# Patient Record
Sex: FEMALE | Race: WHITE | Employment: OTHER | URBAN - NONMETROPOLITAN AREA
[De-identification: names, ages, dates, MRNs, and addresses within clinical notes are randomized per-mention and may not be internally consistent; named-entity substitution may affect disease eponyms.]

---

## 2017-01-03 ENCOUNTER — OFFICE VISIT - GICH (OUTPATIENT)
Dept: FAMILY MEDICINE | Facility: OTHER | Age: 82
End: 2017-01-03

## 2017-01-03 DIAGNOSIS — E78.00 PURE HYPERCHOLESTEROLEMIA: ICD-10-CM

## 2017-01-03 DIAGNOSIS — H35.30 AGE-RELATED MACULAR DEGENERATION: ICD-10-CM

## 2017-01-03 DIAGNOSIS — R26.89 OTHER ABNORMALITIES OF GAIT AND MOBILITY: ICD-10-CM

## 2017-01-03 DIAGNOSIS — E11.8 TYPE 2 DIABETES MELLITUS WITH COMPLICATIONS (H): ICD-10-CM

## 2017-01-03 DIAGNOSIS — I63.9 CEREBRAL INFARCTION (H): ICD-10-CM

## 2017-01-03 LAB
A/G RATIO - HISTORICAL: 1.1 (ref 1–2)
ALBUMIN SERPL-MCNC: 4.3 G/DL (ref 3.5–5.7)
ALP SERPL-CCNC: 67 IU/L (ref 34–104)
ALT (SGPT) - HISTORICAL: 14 IU/L (ref 7–52)
ANION GAP - HISTORICAL: 6 (ref 5–18)
AST SERPL-CCNC: 16 IU/L (ref 13–39)
BILIRUB SERPL-MCNC: 0.5 MG/DL (ref 0.3–1)
BUN SERPL-MCNC: 18 MG/DL (ref 7–25)
BUN/CREAT RATIO - HISTORICAL: 17
CALCIUM SERPL-MCNC: 9.6 MG/DL (ref 8.6–10.3)
CHLORIDE SERPLBLD-SCNC: 104 MMOL/L (ref 98–107)
CHOL/HDL RATIO - HISTORICAL: 3.27
CHOLESTEROL TOTAL: 170 MG/DL
CO2 SERPL-SCNC: 26 MMOL/L (ref 21–31)
CREAT SERPL-MCNC: 1.08 MG/DL (ref 0.7–1.3)
ESTIMATED AVERAGE GLUCOSE: 171 MG/DL
GFR IF NOT AFRICAN AMERICAN - HISTORICAL: 47 ML/MIN/1.73M2
GLOBULIN - HISTORICAL: 3.9 G/DL (ref 2–3.7)
GLUCOSE SERPL-MCNC: 152 MG/DL (ref 70–105)
HDLC SERPL-MCNC: 52 MG/DL (ref 23–92)
HEMOGLOBIN A1C MONITORING (POCT) - HISTORICAL: 7.6 % (ref 4–6.2)
LDLC SERPL CALC-MCNC: 82 MG/DL
NON-HDL CHOLESTEROL - HISTORICAL: 118 MG/DL
PATIENT STATUS - HISTORICAL: ABNORMAL
POTASSIUM SERPL-SCNC: 3.9 MMOL/L (ref 3.5–5.1)
PROT SERPL-MCNC: 8.2 G/DL (ref 6.4–8.9)
SODIUM SERPL-SCNC: 136 MMOL/L (ref 133–143)
TRIGL SERPL-MCNC: 179 MG/DL

## 2017-01-16 ENCOUNTER — AMBULATORY - GICH (OUTPATIENT)
Dept: SCHEDULING | Facility: OTHER | Age: 82
End: 2017-01-16

## 2017-01-22 ENCOUNTER — AMBULATORY - GICH (OUTPATIENT)
Dept: SCHEDULING | Facility: OTHER | Age: 82
End: 2017-01-22

## 2017-02-01 ENCOUNTER — AMBULATORY - GICH (OUTPATIENT)
Dept: SCHEDULING | Facility: OTHER | Age: 82
End: 2017-02-01

## 2017-02-12 ENCOUNTER — COMMUNICATION - GICH (OUTPATIENT)
Dept: FAMILY MEDICINE | Facility: OTHER | Age: 82
End: 2017-02-12

## 2017-02-12 DIAGNOSIS — E11.9 TYPE 2 DIABETES MELLITUS WITHOUT COMPLICATIONS (H): ICD-10-CM

## 2017-04-10 ENCOUNTER — OFFICE VISIT - GICH (OUTPATIENT)
Dept: FAMILY MEDICINE | Facility: OTHER | Age: 82
End: 2017-04-10

## 2017-04-10 ENCOUNTER — HISTORY (OUTPATIENT)
Dept: FAMILY MEDICINE | Facility: OTHER | Age: 82
End: 2017-04-10

## 2017-04-10 DIAGNOSIS — E11.40 TYPE 2 DIABETES MELLITUS WITH DIABETIC NEUROPATHY (H): ICD-10-CM

## 2017-04-10 DIAGNOSIS — H35.30 AGE-RELATED MACULAR DEGENERATION: ICD-10-CM

## 2017-04-10 DIAGNOSIS — E11.8 TYPE 2 DIABETES MELLITUS WITH COMPLICATIONS (H): ICD-10-CM

## 2017-04-10 LAB
ESTIMATED AVERAGE GLUCOSE: 177 MG/DL
HEMOGLOBIN A1C MONITORING (POCT) - HISTORICAL: 7.8 % (ref 4–6.2)

## 2017-06-16 ENCOUNTER — COMMUNICATION - GICH (OUTPATIENT)
Dept: FAMILY MEDICINE | Facility: OTHER | Age: 82
End: 2017-06-16

## 2017-06-16 DIAGNOSIS — E11.8 TYPE 2 DIABETES MELLITUS WITH COMPLICATIONS (H): ICD-10-CM

## 2017-06-16 DIAGNOSIS — E78.00 PURE HYPERCHOLESTEROLEMIA: ICD-10-CM

## 2017-09-18 ENCOUNTER — COMMUNICATION - GICH (OUTPATIENT)
Dept: FAMILY MEDICINE | Facility: OTHER | Age: 82
End: 2017-09-18

## 2017-09-18 DIAGNOSIS — E11.8 TYPE 2 DIABETES MELLITUS WITH COMPLICATIONS (H): ICD-10-CM

## 2017-09-18 DIAGNOSIS — E78.00 PURE HYPERCHOLESTEROLEMIA: ICD-10-CM

## 2017-09-26 ENCOUNTER — HISTORY (OUTPATIENT)
Dept: FAMILY MEDICINE | Facility: OTHER | Age: 82
End: 2017-09-26

## 2017-09-26 ENCOUNTER — OFFICE VISIT - GICH (OUTPATIENT)
Dept: FAMILY MEDICINE | Facility: OTHER | Age: 82
End: 2017-09-26

## 2017-09-26 DIAGNOSIS — E78.00 PURE HYPERCHOLESTEROLEMIA: ICD-10-CM

## 2017-09-26 DIAGNOSIS — Z23 ENCOUNTER FOR IMMUNIZATION: ICD-10-CM

## 2017-09-26 DIAGNOSIS — E11.40 TYPE 2 DIABETES MELLITUS WITH DIABETIC NEUROPATHY (H): ICD-10-CM

## 2017-09-26 DIAGNOSIS — E53.8 DEFICIENCY OF OTHER SPECIFIED B GROUP VITAMINS (CODE): ICD-10-CM

## 2017-09-26 DIAGNOSIS — H35.30 AGE-RELATED MACULAR DEGENERATION: ICD-10-CM

## 2017-09-26 DIAGNOSIS — E11.8 TYPE 2 DIABETES MELLITUS WITH COMPLICATIONS (H): ICD-10-CM

## 2017-09-26 LAB
ANION GAP - HISTORICAL: 9 (ref 5–18)
BUN SERPL-MCNC: 17 MG/DL (ref 7–25)
BUN/CREAT RATIO - HISTORICAL: 17
CALCIUM SERPL-MCNC: 9.5 MG/DL (ref 8.6–10.3)
CHLORIDE SERPLBLD-SCNC: 103 MMOL/L (ref 98–107)
CO2 SERPL-SCNC: 26 MMOL/L (ref 21–31)
CREAT SERPL-MCNC: 1.01 MG/DL (ref 0.7–1.3)
CREATININE RANDOM URINE: 85.1 MG/DL (ref 47–110)
ESTIMATED AVERAGE GLUCOSE: 177 MG/DL
GFR IF NOT AFRICAN AMERICAN - HISTORICAL: 51 ML/MIN/1.73M2
GLUCOSE SERPL-MCNC: 193 MG/DL (ref 70–105)
HEMOGLOBIN A1C MONITORING (POCT) - HISTORICAL: 7.8 % (ref 4–6.2)
POTASSIUM SERPL-SCNC: 3.9 MMOL/L (ref 3.5–5.1)
PROT/CREAT RATIO,UR - HISTORICAL: 0.5
PROTEIN QUANT,RAND URINE - HISTORICAL: 41 MG/DL (ref 1–14)
SODIUM SERPL-SCNC: 138 MMOL/L (ref 133–143)

## 2017-10-12 ENCOUNTER — COMMUNICATION - GICH (OUTPATIENT)
Dept: FAMILY MEDICINE | Facility: OTHER | Age: 82
End: 2017-10-12

## 2017-10-12 DIAGNOSIS — E11.8 TYPE 2 DIABETES MELLITUS WITH COMPLICATIONS (H): ICD-10-CM

## 2017-10-19 ENCOUNTER — COMMUNICATION - GICH (OUTPATIENT)
Dept: FAMILY MEDICINE | Facility: OTHER | Age: 82
End: 2017-10-19

## 2017-10-19 DIAGNOSIS — E11.8 TYPE 2 DIABETES MELLITUS WITH COMPLICATIONS (H): ICD-10-CM

## 2017-12-28 NOTE — TELEPHONE ENCOUNTER
Patient Information     Patient Name MRN Sex Julia Sethi [id number] Female 1922      Telephone Encounter by Mary Paredes RN at 10/19/2017 10:57 AM     Author:  Mary Paredes RN Service:  (none) Author Type:  NURS- Registered Nurse     Filed:  10/19/2017 11:00 AM Encounter Date:  10/19/2017 Status:  Signed     :  Mary Paredes RN (NURS- Registered Nurse)            Diabetic Supplies  Office visit in the past 12 months.  Last visit with GABINO KIRKLAND was on: 2017 in [facility]  Next visit with GABINO KIRKLAND is on: No future appointment listed with this provider  Next visit with Family Practice is on: No future appointment listed in this department  Max refill for 12 months from last office visit.  Always add ICD-9 code.  Needs not be listed on Med List to fill.  Need new RX every 12 months or if exceeds the limitations set by Medicare, then every 6 months.  Also when testing frequency is changed is there a need to obtain a new order.  A refill request does not need to be approved by the ordering physician-a beneficiary or their caregiver may request refills.  Physicians are not required to fill out additional forms such as home testing results for suppliers or provide additional documentation unless the supplier is audited and the  is requesting such documentation.    Prescription refilled per RN Medication Refill Policy.................... Mary Paredes RN ....................  10/19/2017   10:59 AM

## 2017-12-28 NOTE — TELEPHONE ENCOUNTER
Patient Information     Patient Name MRN Sex Julia Sethi [id number] Female 1922      Telephone Encounter by Mariah Bone at 10/16/2017  8:18 AM     Author:  Mariah Bone Service:  (none) Author Type:  (none)     Filed:  10/16/2017  8:19 AM Encounter Date:  10/12/2017 Status:  Signed     :  Mariah Bone            Call placed to patient and states that she does not need anything at this time.  She was up at [facility] earlier today and took care of the pen needle issue she was having.  Mariah Bone LPN ....................  10/16/2017   8:19 AM

## 2017-12-28 NOTE — TELEPHONE ENCOUNTER
Patient Information     Patient Name MRN Sex Julia Sethi [id number] Female 1922      Telephone Encounter by Bella Barclay at 10/12/2017  1:42 PM     Author:  Bella Barclay Service:  (none) Author Type:  (none)     Filed:  10/12/2017  1:43 PM Encounter Date:  10/12/2017 Status:  Signed     :  Bella Barclay            Called pt in regards to message. No answer at this time.  Bella Barclay

## 2017-12-28 NOTE — TELEPHONE ENCOUNTER
Patient Information     Patient Name MRN Sex Julia Sethi [id number] Female 1922      Telephone Encounter by Mary Paredes RN at 2017 12:29 PM     Author:  Mary Paredes RN Service:  (none) Author Type:  NURS- Registered Nurse     Filed:  2017 12:34 PM Encounter Date:  2017 Status:  Signed     :  Mary Paredes RN (NURS- Registered Nurse)            Statins  Office visit in the past 12 months.  Last visit with GABINO KIRKLAND was on: 04/10/2017 in [facility]  Next visit with GABINO KIRKLAND is on: 2017 in [facility]  ast Lipids:  Chol: 170    1/3/2017  T    1/3/2017  HDL:   52    1/3/2017  LDL:  82    1/3/2017  LDL DIRECT:  No results found in past 5 years    .  Concommitant use of fibrates and statins-If it is an addition to the medication list, review note and/or discuss with provider.  If already on medication list, refill.  Max refills 12 months from last office visit.    Prescription refilled per RN Medication Refill Policy.................... Mary Paredes RN ....................  2017   12:34 PM

## 2017-12-28 NOTE — PROGRESS NOTES
Patient Information     Patient Name MRN Sex Julia Sethi [id number] Female 1922      Progress Notes by Della Mauricio MD at 2017 11:00 AM     Author:  Della Mauricio MD Service:  (none) Author Type:  Physician     Filed:  2017 12:33 PM Encounter Date:  2017 Status:  Signed     :  Della Mauricio MD (Physician)            SUBJECTIVE:    Julia Martin is a 89 y.o. female who presents for follow up of type 2 diabetes   Nursing Notes:   Mary Ramos  2017 11:10 AM  Signed  Previous A1C is at goal of <8  HEMOGLOBIN A1C MONITORING (POCT)    Date Value   04/10/2017 7.8 % (H)   2014 9.1 % Total Hgb (H)     Urine microalbumin: labs will be done today   Foot exam today   Eye exam 2017    Patient is not a current smoker  Patient is on a daily aspirin  Patient is on a Statin.  Blood pressure today of   is NOT at the goal of <139/89 for diabetics.    Mary Ramos LPN..............2017 11:02 AM        HPI  Julia Martin is a 89 y.o. female is in for follow up of type 2 diabetes.  History as listed above.  She checks her BS in the morning.  They run from  in the morning.  Later in the day, she thinks they are higher.  It is tough for her to continue to montior her results due to macular degeneration and neuropathy of her hands - has tingling in her fingers and toes.  Occasionally at night her fingers are almost painful.        No Known Allergies,   Current Outpatient Prescriptions     Medication  Sig     aspirin enteric coated (ECOTRIN) 325 mg tablet Take 1 tablet by mouth once daily with a meal.     blood sugar diagnostic (ASCENSIA CONTOUR) strip Test blood sugars once per day Up to 2 times daily if necessary.  Dx: E11.9     cholecalciferol (VITAMIN D) 1,000 unit capsule Take 1 capsule by mouth once daily.     cyanocobalamin 1,000 mcg tablet Take 1 tablet by mouth once daily.     glipiZIDE (GLUCOTROL) 5 mg tablet TAKE 2  TABLETS BY MOUTH EVERY DAY WITH BREAKFAST, THEN TAKE 1 TABLET AT SUPPER TIME.     lancets (MICROLET LANCET) Use to test blood sugars one to two times daily as directed.  Dx: E11.9     simvastatin (ZOCOR) 10 mg tablet Take 1 tablet by mouth at bedtime.     sitaGLIPtin (JANUVIA) 50 mg tablet Take 1 tablet by mouth once daily.     Vit  A,C,E-Zinc-Copper (PRESERVISION AREDS) 14,320-226-200 unit-mg-unit cap Take  by mouth.     Walker Rolling Walker for home use. With seat.     No current facility-administered medications for this visit.      Medications have been reviewed by me and are current to the best of my knowledge and ability.  and   Past Medical History:     Diagnosis  Date     Actinic keratoses      B12 deficiency 4/15/2015     Living will on file 9/23/2015     wears hearing aides        REVIEW OF SYSTEMS:  Review of Systems   HENT:        Episode last 4th of July of awakening with pain in her lower teeth - lasted most of the night.  Took an aspirin for the pain.  Went to dentist - all was good.  Is aware now that this could have been cardiac.   Eyes:        Vision loss   Respiratory: Negative.    Gastrointestinal: Negative.    Musculoskeletal: Negative.      Nocturia x2, otherwise she sleeps well.    Water aerobics a few times a week.  Good appetite.    No joint pain.      OBJECTIVE:  /90  Pulse 72  Ht 1.524 m (5')  Wt 57.6 kg (127 lb)  Breastfeeding? No  BMI 24.8 kg/m2    EXAM:   Physical Exam   Constitutional: She is well-developed, well-nourished, and in no distress.   Musculoskeletal:   Osteoarthritic changes of her   Neurological:   Decreased/absent sensation on monofilament testing of the ends of her fingers, bottoms of her toes.   Psychiatric: Affect normal.   Nursing note and vitals reviewed.      PHQ Depression Screening 8/29/2016 9/26/2017   Date of PHQ exam (doc flow) 8/29/2016 9/26/2017   1. Lack of interest/pleasure 0 - Not at all 0 - Not at all   2. Feeling down/depressed 0 - Not at all  0 - Not at all   PHQ-2 TOTAL SCORE 0 0   3. Trouble sleeping 0 - Not at all -   4. Decreased energy 1 - Several days -   5. Appetite change 0 - Not at all -   6. Feelings of failure 0 - Not at all -   7. Trouble concentrating 0 - Not at all -   8. Activity level 1 - Several days -   9. Hurting yourself 0 - Not at all -   PHQ-9 TOTAL SCORE 2 -   PHQ-9 Severity Level none -   Functional Impairment not difficult at all -   Some recent data might be hidden       Results for orders placed or performed in visit on 09/26/17      Hgb A1c      Result  Value Ref Range    HEMOGLOBIN A1C MONITORING (POCT) 7.8 (H) 4.0 - 6.2 %    ESTIMATED AVERAGE GLUCOSE  177 mg/dL   BASIC METABOLIC PANEL      Result  Value Ref Range    SODIUM 138 133 - 143 mmol/L    POTASSIUM 3.9 3.5 - 5.1 mmol/L    CHLORIDE 103 98 - 107 mmol/L    CO2,TOTAL 26 21 - 31 mmol/L    ANION GAP 9 5 - 18                    GLUCOSE 193 (H) 70 - 105 mg/dL    CALCIUM 9.5 8.6 - 10.3 mg/dL    BUN 17 7 - 25 mg/dL    CREATININE 1.01 0.70 - 1.30 mg/dL    BUN/CREAT RATIO           17                    GFR if African American >60 >60 ml/min/1.73m2    GFR if not African American 51 (L) >60 ml/min/1.73m2   URINE MICROALBUMIN/CREATININE RATIO      Result  Value Ref Range    PROTEIN QUANT,RAND URINE 41 (H) 1 - 14 mg/dL    CREAT,RANDOM URINE 85.1 47.0 - 110.0 mg/dL    PROT/CREAT RATIO,UR       0.5        ASSESSMENT/PLAN:    ICD-10-CM    1. Type 2 diabetes mellitus with diabetic neuropathy, without long-term current use of insulin (HC) E11.40 Hgb A1c      URINE MICROALBUMIN/CREATININE RATIO      BASIC METABOLIC PANEL      Hgb A1c      BASIC METABOLIC PANEL      URINE MICROALBUMIN/CREATININE RATIO   2. MACULAR DEGENERATION, SENILE H35.30    3. Pure hypercholesterolemia E78.00 simvastatin (ZOCOR) 10 mg tablet   4. Needs flu shot Z23 FLU VACCINE => 65 YRS HIGH DOSE TRIVALENT IIV3 IM      ID ADMIN VACC INITIAL   5. B12 deficiency E53.8 cyanocobalamin 1,000 mcg tablet   6. Type 2 diabetes  mellitus with complication, without long-term current use of insulin (HC) E11.8 lancets (MICROLET LANCET)      sitaGLIPtin (JANUVIA) 50 mg tablet      simvastatin (ZOCOR) 10 mg tablet        Plan:  1. Her hemoglobin A1c is stable from her last visit. In previous discussions with the patient, decision was made treat blood sugars to limit her degree of symptoms from hyperglycemia more so than to aim for a hemoglobin A1c goal of less than 7%. I do feel that her risk of hypoglycemia complications with changing her medication is not worth the potential benefits of additional A1c improvement.  2. Discussed with patient the availability of talking glucometers. She'll review this with her pharmacy.  3. Continue with vitamin B12 replacement.  4. Flu vaccine is updated today.  5. She will continue on statin and aspirin therapy.  6. We discussed her episode of jaw and tooth pain, possible anginal equivalent. If she should have similar recurrence, I have recommended that she call 911.    Della Mauricio MD

## 2017-12-28 NOTE — TELEPHONE ENCOUNTER
Patient Information     Patient Name MRN Sex Julia Sethi [id number] Female 1922      Telephone Encounter by Mary Paredes RN at 2017 11:39 AM     Author:  Mary Paredes RN Service:  (none) Author Type:  NURS- Registered Nurse     Filed:  2017 11:42 AM Encounter Date:  2017 Status:  Signed     :  Mary Paredes RN (NURS- Registered Nurse)            Statins  Office visit in the past 12 months.  Last visit with GABINO KIRKLAND was on: 04/10/2017 in [facility]-due for follow up 10/10/17  Next visit with GABINO KIRKLAND is on: No future appointment listed with this provider  Next visit with Family Practice is on: No future appointment listed in this department  Lab testing requirements:  Lipids annually.  Repeat lipids 6-8 weeks after dosage or drug change.  Last Lipids:  Chol: 170    1/3/2017  T    1/3/2017  HDL:   52    1/3/2017  LDL:  82    1/3/2017  LDL DIRECT:  No results found in past 5 years    .  Concommitant use of fibrates and statins-If it is an addition to the medication list, review note and/or discuss with provider.  If already on medication list, refill.  Max refills 12 months from last office visit.    Prescription refilled per RN Medication Refill Policy.................... Mary Paredes RN ....................  2017   11:41 AM

## 2017-12-30 NOTE — NURSING NOTE
Patient Information     Patient Name MRN Sex Julia Sethi [id number] Female 1922      Nursing Note by Mary Ramos at 2017 11:00 AM     Author:  Mary Ramos Service:  (none) Author Type:  (none)     Filed:  2017 11:10 AM Encounter Date:  2017 Status:  Signed     :  Mary Ramos            Previous A1C is at goal of <8  HEMOGLOBIN A1C MONITORING (POCT)    Date Value   04/10/2017 7.8 % (H)   2014 9.1 % Total Hgb (H)     Urine microalbumin: labs will be done today   Foot exam today   Eye exam 2017    Patient is not a current smoker  Patient is on a daily aspirin  Patient is on a Statin.  Blood pressure today of   is NOT at the goal of <139/89 for diabetics.    Mary Ramos LPN..............2017 11:02 AM

## 2018-01-02 NOTE — PATIENT INSTRUCTIONS
Patient Information     Patient Name MRN Sex Julia Sethi [id number] Female 1922      Patient Instructions by Della Mauricio MD at 1/3/2017 11:00 AM     Author:  Della Mauricio MD Service:  (none) Author Type:  Physician     Filed:  1/3/2017 11:20 AM Encounter Date:  1/3/2017 Status:  Signed     :  Della Mauricio MD (Physician)            Prescription is sent to [place] for a walker.

## 2018-01-02 NOTE — PROGRESS NOTES
Patient Information     Patient Name MRN Sex Julia Sotelo [id number] Female 1922      Progress Notes by Della Mauricio MD at 1/3/2017 11:00 AM     Author:  Della Mauricio MD Service:  (none) Author Type:  Physician     Filed:  1/3/2017 12:26 PM Encounter Date:  1/3/2017 Status:  Signed     :  Della Mauricio MD (Physician)            SUBJECTIVE:    Julia Martin is a 89 y.o. female who presents for follow up of type 2 diabetes   Nursing Notes:   Ni Barber  1/3/2017 11:02 AM  Signed  Patient here today for diabetic check  Ni Barber LPN..............................1/3/2017  10:53 AM    HPI  Julia Martin is a 89 y.o. female in for follow up of type 2 diabetes.  She is now taking her Januvia in the morning, for the past 2 months has made that change.  FBS are 130s, higher later thru the day.    HEMOGLOBIN A1C MONITORING (POCT)    Date Value   2017 7.6 % (H)   2014 9.1 % Total Hgb (H)     HEMOGLOBIN A1C GIH (%)    Date Value   2012 8.4 (H)     She had one episode of taking her januvia at bedtime and not eating, was up unable to sleep that night.  No hypoglycemia. She does take an aspirin a day, is on a statin.    No Known Allergies,   Current Outpatient Prescriptions     Medication  Sig     aspirin enteric coated (ECOTRIN) 325 mg tablet Take 1 tablet by mouth once daily with a meal.     blood sugar diagnostic (ASCENSIA CONTOUR) strip Test blood sugars once per day Up to 2 times daily if necessary.  Dx: E11.9     cholecalciferol (VITAMIN D) 1,000 unit capsule Take 1 capsule by mouth once daily.     cyanocobalamin 1,000 mcg tablet Take 1 tablet by mouth once daily.     glipiZIDE (GLUCOTROL) 5 mg tablet TAKE TWO TABLETS BY MOUTH EVERY DAY WITH BREAKFAST, AND TAKE ONE TABLET BY MOUTH AT SUPPERTIME     lancets (MICROLET LANCET) Use to test blood sugars one to two times daily as directed.  Dx: E11.9     simvastatin (ZOCOR) 10 mg tablet Take 1  tablet by mouth at bedtime.     sitaGLIPtin (JANUVIA) 50 mg tablet Take 1 tablet by mouth once daily.     Vit  A,C,E-Zinc-Copper (PRESERVISION AREDS) 14,320226-200 unit-mg-unit cap Take  by mouth.     No current facility-administered medications for this visit.      Medications have been reviewed by me and are current to the best of my knowledge and ability.  and   Past Medical History     Diagnosis  Date     Actinic keratoses      B12 deficiency 4/15/2015     Living will on file 9/23/2015     wears hearing aides        REVIEW OF SYSTEMS:  Review of Systems   Constitutional:        Goes [facility] [email or web address] [facility] twice a week    Eyes:        Vision is much worse, left eye with minimal vision  - followed for macular degeneration   Neurological:        Balance is not as good, especially with first standing; does better if she visualizes herself skking       OBJECTIVE:  /80  Pulse 60  Wt 56.7 kg (125 lb)  BMI 24.41 kg/m2    EXAM:   Physical Exam   Constitutional: She is well-developed, well-nourished, and in no distress.   Eyes:   Very minimal vision, unable to read with just her left eye   Neurological:   Patient is able to get to standing on her own. With eyes closed, she does some mild swaying but does not feel off balance with this.   Vitals reviewed.    Results for orders placed or performed in visit on 01/03/17      HEMOGLOBIN A1C MONITORING (POCT)      Result  Value Ref Range    HEMOGLOBIN A1C MONITORING (POCT) 7.6 (H) 4.0 - 6.2 %    ESTIMATED AVERAGE GLUCOSE  171 mg/dL   COMP METABOLIC PANEL      Result  Value Ref Range    SODIUM 136 133 - 143 mmol/L    POTASSIUM 3.9 3.5 - 5.1 mmol/L    CHLORIDE 104 98 - 107 mmol/L    CO2,TOTAL 26 21 - 31 mmol/L    ANION GAP 6 5 - 18                    GLUCOSE 152 (H) 70 - 105 mg/dL    CALCIUM 9.6 8.6 - 10.3 mg/dL    BUN 18 7 - 25 mg/dL    CREATININE 1.08 0.70 - 1.30 mg/dL    BUN/CREAT RATIO           17                    GFR if African American 57 (L) >60 ml/min/1.73m2     GFR if not  47 (L) >60 ml/min/1.73m2    ALBUMIN 4.3 3.5 - 5.7 g/dL    PROTEIN,TOTAL 8.2 6.4 - 8.9 g/dL    GLOBULIN                  3.9 (H) 2.0 - 3.7 g/dL    A/G RATIO 1.1 1.0 - 2.0                    BILIRUBIN,TOTAL 0.5 0.3 - 1.0 mg/dL    ALK PHOSPHATASE 67 34 - 104 IU/L    ALT (SGPT) 14 7 - 52 IU/L    AST (SGOT) 16 13 - 39 IU/L   LIPID PANEL      Result  Value Ref Range    CHOLESTEROL,TOTAL 170 <200 mg/dL    TRIGLYCERIDES 179 (H) <150 mg/dL    HDL CHOLESTEROL 52 23 - 92 mg/dL    NON-HDL CHOLESTEROL 118 <145 mg/dl    CHOL/HDL RATIO            3.27 <4.50                    LDL CHOLESTEROL 82 <100 mg/dL    PATIENT STATUS            NON-FASTING                     PHQ Depression Screening 3/21/2016 8/29/2016   Date of PHQ exam (doc flow) 3/21/2016 8/29/2016   1. Lack of interest/pleasure 0 - Not at all 0 - Not at all   2. Feeling down/depressed 0 - Not at all 0 - Not at all   PHQ-2 TOTAL SCORE 0 0   3. Trouble sleeping - 0 - Not at all   4. Decreased energy - 1 - Several days   5. Appetite change - 0 - Not at all   6. Feelings of failure - 0 - Not at all   7. Trouble concentrating - 0 - Not at all   8. Activity level - 1 - Several days   9. Hurting yourself - 0 - Not at all   PHQ-9 TOTAL SCORE - 2   PHQ-9 Severity Level - none   Functional Impairment - not difficult at all         ASSESSMENT/PLAN:    ICD-10-CM    1. Type 2 diabetes mellitus with complication, without long-term current use of insulin (HC) E11.8 HEMOGLOBIN A1C MONITORING (POCT)      COMP METABOLIC PANEL      LIPID PANEL      HEMOGLOBIN A1C MONITORING (POCT)      COMP METABOLIC PANEL      LIPID PANEL   2. Pure hypercholesterolemia E78.00 HEMOGLOBIN A1C MONITORING (POCT)      COMP METABOLIC PANEL      LIPID PANEL      HEMOGLOBIN A1C MONITORING (POCT)      COMP METABOLIC PANEL      LIPID PANEL   3. MACULAR DEGENERATION, SENILE H35.30    4. Cerebral infarction, unspecified mechanism (HC) I63.9 Walker   5. Balance problem R26.89 Farhad         Plan:  1. Her hemoglobin A1c has improved. Given her age and other risk factors, I am not recommending any additional changes in her diabetes medications at this time.  2. With her balance concerned, she did ask about a walker. Prescription for this is sent her balance is complicated by diabetes, B12 disease, and macular degeneration.    Follow up in 3-4 months.  Della Mauricio MD

## 2018-01-02 NOTE — NURSING NOTE
Patient Information     Patient Name MRN Sex Julia Sethi [id number] Female 1922      Nursing Note by Ni Barber at 1/3/2017 11:00 AM     Author:  Ni Barber Service:  (none) Author Type:  (none)     Filed:  1/3/2017 11:02 AM Encounter Date:  1/3/2017 Status:  Signed     :  Ni Barber            Patient here today for diabetic check  Ni Barber LPN..............................1/3/2017  10:53 AM

## 2018-01-03 NOTE — TELEPHONE ENCOUNTER
Patient Information     Patient Name MRN Sex Julia Sethi [id number] Female 1922      Telephone Encounter by Ankita Valdivia at 2017  2:17 PM     Author:  Ankita Valdivia Service:  (none) Author Type:  NURS- Registered Nurse     Filed:  2017  2:19 PM Encounter Date:  2017 Status:  Signed     :  Ankita Valdivia (NURS- Registered Nurse)            Sulfonylureas    Office visit in the past 12 months or per provider note.    Last visit with GABINO KIRKLAND was on: 2017 in [facility]  Next visit with GABINO KIRKLAND is on: No future appointment listed with this provider  Next visit with Family Practice is on: No future appointment listed in this department    Lab test requirements:  HgbA1c annually or per provider note.  HEMOGLOBIN A1C MONITORING (POCT)    Date Value   2017 7.6 % (H)   2014 9.1 % Total Hgb (H)     HEMOGLOBIN A1C GI (%)    Date Value   2012 8.4 (H)       Max refill for 12 months from last office visit or per provider note.      Prescription refilled per RN Medication Refill Policy.................... Ankita Valdivia RN ....................  2017   2:19 PM

## 2018-01-04 NOTE — PROGRESS NOTES
Patient Information     Patient Name MRN Sex Julia Sethi [id number] Female 1922      Progress Notes by Della Mauricio MD at 4/10/2017  2:30 PM     Author:  Della Mauricio MD Service:  (none) Author Type:  Physician     Filed:  2017 10:58 AM Encounter Date:  4/10/2017 Status:  Signed     :  Della Mauricio MD (Physician)            SUBJECTIVE:    Julia Martin is a 89 y.o. female who presents for follow up of type 2 diabetes  Nursing Notes:   Ni Barber  4/10/2017  2:38 PM  Signed  Patient here today for diabetic check  Ni Barber LPN..............................4/10/2017  2:15 PM      HPI  Julia Martin is a 89 y.o. female in for follow up of type 2 diabetes.  HEMOGLOBIN A1C MONITORING (POCT)    Date Value   04/10/2017 7.8 % (H)   2014 9.1 % Total Hgb (H)     Last Lipids:  Chol: 1/3/2017 170  T/3/2017 179  HDL: 1/3/2017 52   LDL: 1/3/2017 82  Outpatient Diabetes Medications as of 4/10/2017       Refills Start End    glipiZIDE (GLUCOTROL) 5 mg tablet 3 2017     Sig: TAKE 2 TABLETS BY MOUTH EVERY DAY WITH BREAKFAST, THEN TAKE 1 TABLET AT SUPPER TIME.       Class: eRx       Route: Oral       Cosign for Ordering: Accepted by Della Mauricio MD on 2017  3:58 PM           And Januvia 50mg.      Home monitoring:yes - a couple times a week  FBS range from 90s-150s, later in the day will be higher; she hasn't had any low blood sugars.  In February, she had a longer stretch of having the stomach flu and her blood sugars were quite a bit higher then.    Aspirin:  yes  Statin:  Yes, zocor  Last eye exam: regular eye exams due to macular degeneration        No Known Allergies,   Current Outpatient Prescriptions     Medication  Sig     aspirin enteric coated (ECOTRIN) 325 mg tablet Take 1 tablet by mouth once daily with a meal.     blood sugar diagnostic (ASCENSIA CONTOUR) strip Test blood sugars once per day Up to 2 times daily if  necessary.  Dx: E11.9     cholecalciferol (VITAMIN D) 1,000 unit capsule Take 1 capsule by mouth once daily.     cyanocobalamin 1,000 mcg tablet Take 1 tablet by mouth once daily.     glipiZIDE (GLUCOTROL) 5 mg tablet TAKE 2 TABLETS BY MOUTH EVERY DAY WITH BREAKFAST, THEN TAKE 1 TABLET AT SUPPER TIME.     lancets (MICROLET LANCET) Use to test blood sugars one to two times daily as directed.  Dx: E11.9     simvastatin (ZOCOR) 10 mg tablet Take 1 tablet by mouth at bedtime.     sitaGLIPtin (JANUVIA) 50 mg tablet Take 1 tablet by mouth once daily.     Vit  A,C,E-Zinc-Copper (PRESERVISION AREDS) 14,320-226-200 unit-mg-unit cap Take  by mouth.     Walker Rolling Walker for home use. With seat.     No current facility-administered medications for this visit.      Medications have been reviewed by me and are current to the best of my knowledge and ability.  and   Past Medical History:     Diagnosis  Date     Actinic keratoses      B12 deficiency 4/15/2015     Living will on file 9/23/2015     wears hearing aides        REVIEW OF SYSTEMS:  Review of Systems   Constitutional:        Does water exercise twice a week at [facility] where she [facility].     Musculoskeletal: Negative for falls.   Neurological: Positive for tingling and sensory change.        Decreased sensation fingers and feet.       OBJECTIVE:  /70  Pulse 66  Wt 55.2 kg (121 lb 12.8 oz)  BMI 23.79 kg/m2    EXAM:   Physical Exam   Constitutional: She is well-developed, well-nourished, and in no distress.   Cardiovascular: Normal rate and regular rhythm.    Pulmonary/Chest: Breath sounds normal.   Musculoskeletal:   OA changes of her fingers   Neurological:   Decreased sensation of feet to mid foot   Psychiatric: Affect normal.   Nursing note and vitals reviewed.    PHQ Depression Screening 3/21/2016 8/29/2016   Date of PHQ exam (doc flow) 3/21/2016 8/29/2016   1. Lack of interest/pleasure 0 - Not at all 0 - Not at all   2. Feeling down/depressed 0 - Not  at all 0 - Not at all   PHQ-2 TOTAL SCORE 0 0   3. Trouble sleeping - 0 - Not at all   4. Decreased energy - 1 - Several days   5. Appetite change - 0 - Not at all   6. Feelings of failure - 0 - Not at all   7. Trouble concentrating - 0 - Not at all   8. Activity level - 1 - Several days   9. Hurting yourself - 0 - Not at all   PHQ-9 TOTAL SCORE - 2   PHQ-9 Severity Level - none   Functional Impairment - not difficult at all         ASSESSMENT/PLAN:    ICD-10-CM    1. Type 2 diabetes mellitus with complication, without long-term current use of insulin (HC) E11.8 Hgb A1c      Hgb A1c   2. Type 2 diabetes mellitus with diabetic neuropathy, without long-term current use of insulin (HC) E11.40    3. MACULAR DEGENERATION, SENILE H35.30         Plan:  1.  a1c testing today.  Goal for her is under 8 and hypoglycemia prevention.  2.  Continue regular eye follow up visits.  Follow up in 3-6 months.  Della Mauricio MD

## 2018-01-04 NOTE — NURSING NOTE
Patient Information     Patient Name MRN Sex Julia Sethi [id number] Female 1922      Nursing Note by Ni Barber at 4/10/2017  2:30 PM     Author:  Ni Barber Service:  (none) Author Type:  (none)     Filed:  4/10/2017  2:38 PM Encounter Date:  4/10/2017 Status:  Signed     :  Ni Barber            Patient here today for diabetic check  Ni Barber LPN..............................4/10/2017  2:15 PM

## 2018-01-18 RX ORDER — SIMVASTATIN 10 MG
10 TABLET ORAL AT BEDTIME
COMMUNITY
Start: 2017-09-26 | End: 2018-04-23

## 2018-01-18 RX ORDER — VIT A/VIT C/VIT E/ZINC/COPPER 4296-226
1 CAPSULE ORAL DAILY
COMMUNITY
Start: 2015-11-18

## 2018-01-18 RX ORDER — GLIPIZIDE 5 MG/1
TABLET ORAL
COMMUNITY
Start: 2017-02-13 | End: 2018-04-23

## 2018-01-27 VITALS
BODY MASS INDEX: 24.41 KG/M2 | HEART RATE: 60 BPM | BODY MASS INDEX: 24.94 KG/M2 | HEIGHT: 60 IN | DIASTOLIC BLOOD PRESSURE: 80 MMHG | DIASTOLIC BLOOD PRESSURE: 90 MMHG | SYSTOLIC BLOOD PRESSURE: 140 MMHG | WEIGHT: 127 LBS | HEART RATE: 72 BPM | WEIGHT: 125 LBS | SYSTOLIC BLOOD PRESSURE: 132 MMHG

## 2018-01-27 VITALS
SYSTOLIC BLOOD PRESSURE: 110 MMHG | DIASTOLIC BLOOD PRESSURE: 70 MMHG | BODY MASS INDEX: 23.79 KG/M2 | HEART RATE: 66 BPM | WEIGHT: 121.8 LBS

## 2018-01-29 ENCOUNTER — COMMUNICATION - GICH (OUTPATIENT)
Dept: FAMILY MEDICINE | Facility: OTHER | Age: 83
End: 2018-01-29

## 2018-01-29 DIAGNOSIS — E11.9 TYPE 2 DIABETES MELLITUS WITHOUT COMPLICATIONS (H): ICD-10-CM

## 2018-02-13 NOTE — TELEPHONE ENCOUNTER
Patient Information     Patient Name MRN Sex Julia Sethi [id number] Female 1922      Telephone Encounter by Mary Paredes RN at 2018  2:14 PM     Author:  Mary Paredes RN Service:  (none) Author Type:  NURS- Registered Nurse     Filed:  2018  2:16 PM Encounter Date:  2018 Status:  Signed     :  Mary Paredes RN (NURS- Registered Nurse)            Sulfonylureas  Office visit in the past 12 months or per provider note.  Last visit with GABINO KIRKLAND was on: 2017 in [facility]  Next visit with GABINO KIRKLAND is on: No future appointment listed with this provider  Next visit with Family Practice is on: No future appointment listed in this department  Lab test requirements:  HgbA1c annually or per provider note.  HEMOGLOBIN A1C MONITORING (POCT)    Date Value   2017 7.8 % (H)   2014 9.1 % Total Hgb (H)   Max refill for 12 months from last office visit or per provider note.  Prescription refilled per RN Medication Refill Policy.................... Mary Paredes RN ....................  2018   2:15 PM

## 2018-02-28 ENCOUNTER — TRANSFERRED RECORDS (OUTPATIENT)
Dept: HEALTH INFORMATION MANAGEMENT | Facility: OTHER | Age: 83
End: 2018-02-28

## 2018-04-23 ENCOUNTER — OFFICE VISIT (OUTPATIENT)
Dept: FAMILY MEDICINE | Facility: OTHER | Age: 83
End: 2018-04-23
Attending: FAMILY MEDICINE
Payer: MEDICARE

## 2018-04-23 VITALS
WEIGHT: 122.6 LBS | SYSTOLIC BLOOD PRESSURE: 114 MMHG | HEART RATE: 88 BPM | BODY MASS INDEX: 23.94 KG/M2 | DIASTOLIC BLOOD PRESSURE: 64 MMHG

## 2018-04-23 DIAGNOSIS — G56.02 CARPAL TUNNEL SYNDROME OF LEFT WRIST: ICD-10-CM

## 2018-04-23 DIAGNOSIS — R20.2 LEFT HAND PARESTHESIA: ICD-10-CM

## 2018-04-23 DIAGNOSIS — E11.8 TYPE 2 DIABETES MELLITUS WITH COMPLICATION, WITHOUT LONG-TERM CURRENT USE OF INSULIN (H): Primary | ICD-10-CM

## 2018-04-23 DIAGNOSIS — H35.30 MACULAR DEGENERATION (SENILE) OF RETINA: ICD-10-CM

## 2018-04-23 DIAGNOSIS — E53.8 B12 DEFICIENCY: ICD-10-CM

## 2018-04-23 LAB
ALBUMIN SERPL-MCNC: 4.3 G/DL (ref 3.5–5.7)
ALP SERPL-CCNC: 73 U/L (ref 34–104)
ALT SERPL W P-5'-P-CCNC: 16 U/L (ref 7–52)
ANION GAP SERPL CALCULATED.3IONS-SCNC: 8 MMOL/L (ref 3–14)
AST SERPL W P-5'-P-CCNC: 20 U/L (ref 13–39)
BILIRUB SERPL-MCNC: 0.4 MG/DL (ref 0.3–1)
BUN SERPL-MCNC: 21 MG/DL (ref 7–25)
CALCIUM SERPL-MCNC: 9.7 MG/DL (ref 8.6–10.3)
CHLORIDE SERPL-SCNC: 102 MMOL/L (ref 98–107)
CO2 SERPL-SCNC: 28 MMOL/L (ref 21–31)
CREAT SERPL-MCNC: 1.07 MG/DL (ref 0.6–1.2)
GFR SERPL CREATININE-BSD FRML MDRD: 48 ML/MIN/1.7M2
GLUCOSE SERPL-MCNC: 188 MG/DL (ref 70–105)
HBA1C MFR BLD: 7.5 % (ref 4–6)
POTASSIUM SERPL-SCNC: 4.1 MMOL/L (ref 3.5–5.1)
PROT SERPL-MCNC: 8.2 G/DL (ref 6.4–8.9)
SODIUM SERPL-SCNC: 138 MMOL/L (ref 134–144)
VIT B12 SERPL-MCNC: >1500 PG/ML (ref 180–914)

## 2018-04-23 PROCEDURE — 99214 OFFICE O/P EST MOD 30 MIN: CPT | Performed by: FAMILY MEDICINE

## 2018-04-23 PROCEDURE — G0463 HOSPITAL OUTPT CLINIC VISIT: HCPCS

## 2018-04-23 PROCEDURE — 80053 COMPREHEN METABOLIC PANEL: CPT | Performed by: FAMILY MEDICINE

## 2018-04-23 PROCEDURE — 83036 HEMOGLOBIN GLYCOSYLATED A1C: CPT | Performed by: FAMILY MEDICINE

## 2018-04-23 PROCEDURE — 82607 VITAMIN B-12: CPT | Performed by: FAMILY MEDICINE

## 2018-04-23 PROCEDURE — 36415 COLL VENOUS BLD VENIPUNCTURE: CPT | Performed by: FAMILY MEDICINE

## 2018-04-23 RX ORDER — GLIPIZIDE 5 MG/1
5 TABLET ORAL
Qty: 90 TABLET | Refills: 3 | Status: SHIPPED | OUTPATIENT
Start: 2018-04-23 | End: 2018-09-10

## 2018-04-23 RX ORDER — SIMVASTATIN 10 MG
10 TABLET ORAL AT BEDTIME
Qty: 90 TABLET | Refills: 3 | Status: SHIPPED | OUTPATIENT
Start: 2018-04-23 | End: 2019-03-18

## 2018-04-23 ASSESSMENT — PAIN SCALES - GENERAL: PAINLEVEL: NO PAIN (0)

## 2018-04-23 ASSESSMENT — ENCOUNTER SYMPTOMS
FREQUENCY: 1
HEMATOLOGIC/LYMPHATIC NEGATIVE: 1
PSYCHIATRIC NEGATIVE: 1
UNEXPECTED WEIGHT CHANGE: 0

## 2018-04-23 NOTE — NURSING NOTE
Patient presents today for diabetic check up.     Previous A1C is at goal of <8  9/26/2017, A1C 7.8  Urine microalbumin:creatine:   Foot exam 9/26/2017  Eye exam 10/2017    Tobacco User no  Patient is on a daily aspirin  Patient is on a Statin.  Blood pressure today of:     BP Readings from Last 1 Encounters:   04/23/18 114/64      is at the goal of <139/89 for diabetics.    Letty Snell LPN on 4/23/2018 at 1:08 PM        Letty Snell LPN on 4/23/2018 at 1:04 PM

## 2018-04-23 NOTE — LETTER
April 23, 2018      Julia Martin  [address]   [place] [address]        Dear Julia,       Here is a copy of your recent labs:    Results for orders placed or performed in visit on 04/23/18   Hemoglobin A1c   Result Value Ref Range    Hemoglobin A1C 7.5 (H) 4.0 - 6.0 %   Comprehensive metabolic panel   Result Value Ref Range    Sodium 138 134 - 144 mmol/L    Potassium 4.1 3.5 - 5.1 mmol/L    Chloride 102 98 - 107 mmol/L    Carbon Dioxide 28 21 - 31 mmol/L    Anion Gap 8 3 - 14 mmol/L    Glucose 188 (H) 70 - 105 mg/dL    Urea Nitrogen 21 7 - 25 mg/dL    Creatinine 1.07 0.60 - 1.20 mg/dL    GFR Estimate 48 (L) >60 mL/min/1.7m2    GFR Estimate If Black 58 (L) >60 mL/min/1.7m2    Calcium 9.7 8.6 - 10.3 mg/dL    Bilirubin Total 0.4 0.3 - 1.0 mg/dL    Albumin 4.3 3.5 - 5.7 g/dL    Protein Total 8.2 6.4 - 8.9 g/dL    Alkaline Phosphatase 73 34 - 104 U/L    ALT 16 7 - 52 U/L    AST 20 13 - 39 U/L   Vitamin B12   Result Value Ref Range    Vitamin B12 >1500 (H) 180 - 914 pg/mL           These results are normal - including your vitamin VITAMIN B12 level.      Sincerely,        GABINO LÓPEZ MD

## 2018-04-23 NOTE — MR AVS SNAPSHOT
"              After Visit Summary   4/23/2018    Julia Martin    MRN: [id number]           Patient Information     Date Of Birth          1/9/1922        Visit Information        Provider Department      4/23/2018 1:15 PM Della Rhodes MD [facility]        Today's Diagnoses     Type 2 diabetes mellitus with complication, without long-term current use of insulin (H)    -  1    Left hand paresthesia        Carpal tunnel syndrome of left wrist        B12 deficiency        Living will on file        Macular degeneration (senile) of retina           Follow-ups after your visit        Who to contact     If you have questions or need follow up information about today's clinic visit or your schedule please contact [facility] directly at [phone].  Normal or non-critical lab and imaging results will be communicated to you by [employer] HOLD[employer]art, letter or phone within 4 business days after the clinic has received the results. If you do not hear from us within 7 days, please contact the clinic through [employer] or phone. If you have a critical or abnormal lab result, we will notify you by phone as soon as possible.  Submit refill requests through [email or web address] or call your pharmacy and they will forward the refill request to us. Please allow 3 business days for your refill to be completed.          Additional Information About Your Visit        TIFF[employer] TRE[employer] HOLDINGSharMumart Information     [email or web address] lets you send messages to your doctor, view your test results, renew your prescriptions, schedule appointments and more. To sign up, go to [email or web address] . Click on \"Log in\" on the left side of the screen, which will take you to the Welcome page. Then click on \"Sign up Now\" on the right side of the page.     You will be asked to enter the access code listed below, as well as some personal information. Please follow the directions to create your username and password.     Your access code is: " [email or web address]  Expires: 2018  4:45 PM     Your access code will  in 90 days. If you need help or a new code, please call your [facility] clinic or [phone].        Care EveryWhere ID     This is your Care EveryWhere ID. This could be used by other organizations to access your [facility] medical records  [id number]        Your Vitals Were     Pulse Breastfeeding? BMI (Body Mass Index)             88 No 23.94 kg/m2          Blood Pressure from Last 3 Encounters:   18 114/64   17 140/90   04/10/17 110/70    Weight from Last 3 Encounters:   18 122 lb 9.6 oz (55.6 kg)   17 127 lb (57.6 kg)   04/10/17 121 lb 12.8 oz (55.2 kg)              We Performed the Following     Comprehensive metabolic panel     EXTENSOR I WRIST LAKE M LT     Hemoglobin A1c     Vitamin B12          Today's Medication Changes          These changes are accurate as of 18  4:45 PM.  If you have any questions, ask your nurse or doctor.               These medicines have changed or have updated prescriptions.        Dose/Directions    glipiZIDE 5 MG tablet   Commonly known as:  GLUCOTROL   This may have changed:    - how much to take  - when to take this   Used for:  Type 2 diabetes mellitus with complication, without long-term current use of insulin (H)   Changed by:  Della Rhodes MD        Dose:  5 mg   Take 1 tablet (5 mg) by mouth 2 times daily (before meals) TAKE 2 TABLETS BY MOUTH EVERY DAY WITH BREAKFAST, THEN TAKE 1 TABLET AT SUPPER TIME.   Quantity:  90 tablet   Refills:  3       sitagliptin 50 MG tablet   Commonly known as:  JANUVIA   This may have changed:  additional instructions   Used for:  Type 2 diabetes mellitus with complication, without long-term current use of insulin (H)   Changed by:  Della Rhodes MD        Dose:  50 mg   Take 1 tablet (50 mg) by mouth daily Take 50 mg by mouth daily   Quantity:  90 tablet   Refills:  3            Where to get your medicines       These medications were sent to [facility] - [place], [place] - [address]  [address] [place], [facility] [address]     Phone:  [phone]     simvastatin 10 MG tablet    sitagliptin 50 MG tablet         Some of these will need a paper prescription and others can be bought over the counter.  Ask your nurse if you have questions.     Bring a paper prescription for each of these medications     glipiZIDE 5 MG tablet                Primary Care Provider Office Phone # Fax #    Della GAXIOLA Anibal Santos [phone]       [address]  [facility] [address]        Equal Access to Services     [facility]: Hadii aad tere hadasho Sokateali, waaxda luqadaha, qaybta kaalmada adeegyada, monica faria . So [facility] [place] [phone].    ATENCIÓN: Si habla español, tiene a temple disposición servicios gratuitos de asistencia lingüística. [facility] [phone].    We comply with applicable federal civil rights laws and [place] laws. We do not discriminate on the basis of race, color, national origin, age, disability, sex, sexual orientation, or gender identity.            Thank you!     Thank you for choosing [facility] AND [facility]  for your care. Our goal is always to provide you with excellent care. Hearing back from our patients is one way we can continue to improve our services. Please take a few minutes to complete the written survey that you may receive in the mail after your visit with us. Thank you!             Your Updated Medication List - Protect others around you: Learn how to safely use, store and throw away your medicines at www.disposemymeds.org.          This list is accurate as of 4/23/18  4:45 PM.  Always use your most recent med list.                   Brand Name Dispense Instructions for use Diagnosis    aspirin 325 MG EC tablet      Take 325 mg by mouth daily with food        GLENIS CONTOUR test strip   Generic drug:   blood glucose monitoring      Test blood sugars once per day Up to 2 times daily if necessary.  Dx: E11.9        blood glucose monitoring lancets      Use to test blood sugars one to two times daily as directed.  Dx: E11.9        glipiZIDE 5 MG tablet    GLUCOTROL    90 tablet    Take 1 tablet (5 mg) by mouth 2 times daily (before meals) TAKE 2 TABLETS BY MOUTH EVERY DAY WITH BREAKFAST, THEN TAKE 1 TABLET AT SUPPER TIME.    Type 2 diabetes mellitus with complication, without long-term current use of insulin (H)       PRESERVISION AREDS Caps           RA VITAMIN B-12 TR 1000 MCG Tbcr   Generic drug:  cyanocobalamin      Take 1,000 mcg by mouth daily        roller walker Misc      Rolling Walker for home use. With seat.        simvastatin 10 MG tablet    ZOCOR    90 tablet    Take 1 tablet (10 mg) by mouth At Bedtime    Type 2 diabetes mellitus with complication, without long-term current use of insulin (H)       sitagliptin 50 MG tablet    JANUVIA    90 tablet    Take 1 tablet (50 mg) by mouth daily Take 50 mg by mouth daily    Type 2 diabetes mellitus with complication, without long-term current use of insulin (H)       vitamin D3 1000 units Caps      Take 1,000 Units by mouth daily

## 2018-04-23 NOTE — PROGRESS NOTES
Nursing Notes:   Letty Snell LPN  4/23/2018  1:22 PM  Signed  Patient presents today for diabetic check up.     Previous A1C is at goal of <8  9/26/2017, A1C 7.8  Urine microalbumin:creatine:   Foot exam 9/26/2017  Eye exam 10/2017    Tobacco User no  Patient is on a daily aspirin  Patient is on a Statin.  Blood pressure today of:     BP Readings from Last 1 Encounters:   04/23/18 114/64      is at the goal of <139/89 for diabetics.    Letty Snell LPN on 4/23/2018 at 1:08 PM        Letty Snell LPN on 4/23/2018 at 1:04 PM        SUBJECTIVE:   CC:  Julia Martin is a 89 year old female who presents to clinic today for the following health issues:  Type 2 diabetes     HPI  Julia Martin is a 89 year old female in for follow up of type 2 diabetes.  She is on oral medications for this.  She doesn't check BS daily - is difficult for her to see her meter due to severe macular degeneration..  Her FBS  Are from 100-200.  No low BS.  Was very sick in February - wasn't taking her readings then.    With her vision, she is very limited in reading, seeing faces.      She lives at [facility].  Does water classes twice a week and elliptical one day a week.  She does have a walker which she uses intermittently.  Oftentimes she will use the walker to help with her balance problems because of her vision.  Is up at night due to nocturia - can get back to sleep quickly.    Living will is reviewed.  Her current goal is to make it to the 2018 midterm elections.       No Known Allergies  Current Outpatient Prescriptions   Medication     aspirin  MG EC tablet     blood glucose monitoring (GLENIS CONTOUR) test strip     blood glucose monitoring (GLENIS MICROLET) lancets     Cholecalciferol (VITAMIN D3) 1000 UNITS CAPS     cyanocobalamin (RA VITAMIN B-12 TR) 1000 MCG TBCR     glipiZIDE (GLUCOTROL) 5 MG tablet     Misc. Devices (ROLLER WALKER) MISC     Multiple Vitamins-Minerals (PRESERVISION AREDS) CAPS      simvastatin (ZOCOR) 10 MG tablet     sitagliptin (JANUVIA) 50 MG tablet     [DISCONTINUED] glipiZIDE (GLUCOTROL) 5 MG tablet     [DISCONTINUED] simvastatin (ZOCOR) 10 MG tablet     [DISCONTINUED] sitagliptin (JANUVIA) 50 MG tablet     No current facility-administered medications for this visit.       Patient Active Problem List    Diagnosis Date Noted     Type 2 diabetes mellitus with complication, without long-term current use of insulin (H) 08/29/2016     Priority: Medium     Living will on file 09/23/2015     Priority: Medium     B12 deficiency 04/15/2015     Priority: Medium     Cerebral infarction (H) 10/02/2014     Priority: Medium     Diabetic neuropathy, type II diabetes mellitus (H) 04/07/2014     Priority: Medium     ACP (advance care planning) 12/10/2013     Priority: Medium     Carpal tunnel syndrome of left wrist 07/09/2013     Priority: Medium     Macular degeneration (senile) of retina 07/25/2012     Priority: Medium     Actinic keratosis 06/27/2008     Priority: Medium     Pure hypercholesterolemia 11/02/2004     Priority: Medium     Family History   Problem Relation Age of Onset     Breast Cancer Mother      Cancer-breast, breast cancer     Other - See Comments Mother      Stroke,stroke or sudden death     Other - See Comments Daughter       Parkinson's     Other - See Comments Brother       Parkinson's     Other - See Comments Brother       kidney failure     Other - See Comments Sister      Stroke,stroke     Other - See Comments Daughter      ovarian cancer     Social History   Substance Use Topics     Smoking status: Former Smoker     Smokeless tobacco: Never Used     Alcohol use No       Review of Systems   Constitutional: Negative for unexpected weight change.   HENT:        Bilateral hearing aids   Eyes: Positive for visual disturbance.   Genitourinary: Positive for frequency and pelvic pain.   Hematological: Negative.    Psychiatric/Behavioral: Negative.     she is having tingling in her  fingers at night - better with moving her fingers; she has a previous diagnosis of right carpal tunnel syndrome, has had a brace for that.  She would like one for her left hand.  Hearing aids      PHQ-2 Score:     PHQ-2 ( 1999 Pfizer) 4/23/2018   Q1: Little interest or pleasure in doing things 0   Q2: Feeling down, depressed or hopeless 0   PHQ-2 Score 0           PHQ-9 SCORE 8/29/2016   Total Score 2         OBJECTIVE:     /64 (BP Location: Right arm, Patient Position: Sitting, Cuff Size: Adult Regular)  Pulse 88  Wt 122 lb 9.6 oz (55.6 kg)  Breastfeeding? No  BMI 23.94 kg/m2  Body mass index is 23.94 kg/(m^2).  Physical Exam   Constitutional: She appears well-developed and well-nourished.   Neck:   Injection of right lower eyelid   Cardiovascular: Normal rate and regular rhythm.    Pulmonary/Chest: Breath sounds normal.   Musculoskeletal:   Osteoarthritic changes of her hands   Nursing note and vitals reviewed.       Results for orders placed or performed in visit on 04/23/18   Hemoglobin A1c   Result Value Ref Range    Hemoglobin A1C 7.5 (H) 4.0 - 6.0 %   Comprehensive metabolic panel   Result Value Ref Range    Sodium 138 134 - 144 mmol/L    Potassium 4.1 3.5 - 5.1 mmol/L    Chloride 102 98 - 107 mmol/L    Carbon Dioxide 28 21 - 31 mmol/L    Anion Gap 8 3 - 14 mmol/L    Glucose 188 (H) 70 - 105 mg/dL    Urea Nitrogen 21 7 - 25 mg/dL    Creatinine 1.07 0.60 - 1.20 mg/dL    GFR Estimate 48 (L) >60 mL/min/1.7m2    GFR Estimate If Black 58 (L) >60 mL/min/1.7m2    Calcium 9.7 8.6 - 10.3 mg/dL    Bilirubin Total 0.4 0.3 - 1.0 mg/dL    Albumin 4.3 3.5 - 5.7 g/dL    Protein Total 8.2 6.4 - 8.9 g/dL    Alkaline Phosphatase 73 34 - 104 U/L    ALT 16 7 - 52 U/L    AST 20 13 - 39 U/L   Vitamin B12   Result Value Ref Range    Vitamin B12 >1500 (H) 180 - 914 pg/mL         ASSESSMENT/PLAN:       ICD-10-CM    1. Type 2 diabetes mellitus with complication, without long-term current use of insulin (H) E11.8 Hemoglobin  A1c     Comprehensive metabolic panel     sitagliptin (JANUVIA) 50 MG tablet     simvastatin (ZOCOR) 10 MG tablet     glipiZIDE (GLUCOTROL) 5 MG tablet   2. Left hand paresthesia R20.2 EXTENSOR I WRIST TAN M LT   3. Carpal tunnel syndrome of left wrist G56.02 EXTENSOR I WRIST LAKE M LT     Vitamin B12   4. B12 deficiency E53.8 Vitamin B12   5. Living will on file Z87.898    6. Macular degeneration (senile) of retina H35.30             PLAN:  1.  I have personally reviewed the labs listed above.  2.  Continue same diabetes treatment.  Main goal is to control general symptoms of hyperglycemia and prevent hypoglycemic episodes.  3.  Left wrist brace is given.  She is to wear this as needed for comfort.  4.  Continue same B12 replacement.  5.  She will continue follow-up with ophthalmology as indicated.  6.  Living will reviewed, goals of care reviewed.    Follow-up in 6 months, after the election      GABINO LÓPEZ MD  [facility]

## 2018-07-23 NOTE — PROGRESS NOTES
Patient Information     Patient Name  Julia Martin MRN  [id number] Sex  Female   1922      Letter by Della Paz MD at      Author:  Della Paz MD Service:  (none) Author Type:  (none)    Filed:   Encounter Date:  1/3/2017 Status:  (Other)           Julia Martin  [address]  [address]  [place] [address]          January 3, 2017    Dear Ms. Martin:    Here is a copy of today's results:    Results for orders placed or performed in visit on 17      HEMOGLOBIN A1C MONITORING (POCT)      Result  Value Ref Range    HEMOGLOBIN A1C MONITORING (POCT) 7.6 (H) 4.0 - 6.2 %    ESTIMATED AVERAGE GLUCOSE  171 mg/dL   COMP METABOLIC PANEL      Result  Value Ref Range    SODIUM 136 133 - 143 mmol/L    POTASSIUM 3.9 3.5 - 5.1 mmol/L    CHLORIDE 104 98 - 107 mmol/L    CO2,TOTAL 26 21 - 31 mmol/L    ANION GAP 6 5 - 18                    GLUCOSE 152 (H) 70 - 105 mg/dL    CALCIUM 9.6 8.6 - 10.3 mg/dL    BUN 18 7 - 25 mg/dL    CREATININE 1.08 0.70 - 1.30 mg/dL    BUN/CREAT RATIO           17                    GFR if African American 57 (L) >60 ml/min/1.73m2    GFR if not  47 (L) >60 ml/min/1.73m2    ALBUMIN 4.3 3.5 - 5.7 g/dL    PROTEIN,TOTAL 8.2 6.4 - 8.9 g/dL    GLOBULIN                  3.9 (H) 2.0 - 3.7 g/dL    A/G RATIO 1.1 1.0 - 2.0                    BILIRUBIN,TOTAL 0.5 0.3 - 1.0 mg/dL    ALK PHOSPHATASE 67 34 - 104 IU/L    ALT (SGPT) 14 7 - 52 IU/L    AST (SGOT) 16 13 - 39 IU/L   LIPID PANEL      Result  Value Ref Range    CHOLESTEROL,TOTAL 170 <200 mg/dL    TRIGLYCERIDES 179 (H) <150 mg/dL    HDL CHOLESTEROL 52 23 - 92 mg/dL    NON-HDL CHOLESTEROL 118 <145 mg/dl    CHOL/HDL RATIO            3.27 <4.50                    LDL CHOLESTEROL 82 <100 mg/dL    PATIENT STATUS            NON-FASTING                     I am happy to see that your A1c level has improved a fair amount since last fall.  No changes in her medication are needed at this  time.    Sincerely,  Della Maurciio MD FAAFP 1/3/2017 12:22 PM

## 2018-07-24 NOTE — PROGRESS NOTES
Patient Information     Patient Name  Julia Martin MRN  [id number] Sex  Female   1922      Letter by Della Paz MD at      Author:  Della Paz MD Service:  (none) Author Type:  (none)    Filed:   Encounter Date:  2017 Status:  (Other)           Julia Martin  [address]  [address]  [place] [address]          2017    Dear Ms. Martin:    Here is a copy of your recent labs:      Results for orders placed or performed in visit on 17      Hgb A1c      Result  Value Ref Range    HEMOGLOBIN A1C MONITORING (POCT) 7.8 (H) 4.0 - 6.2 %    ESTIMATED AVERAGE GLUCOSE  177 mg/dL   BASIC METABOLIC PANEL      Result  Value Ref Range    SODIUM 138 133 - 143 mmol/L    POTASSIUM 3.9 3.5 - 5.1 mmol/L    CHLORIDE 103 98 - 107 mmol/L    CO2,TOTAL 26 21 - 31 mmol/L    ANION GAP 9 5 - 18                    GLUCOSE 193 (H) 70 - 105 mg/dL    CALCIUM 9.5 8.6 - 10.3 mg/dL    BUN 17 7 - 25 mg/dL    CREATININE 1.01 0.70 - 1.30 mg/dL    BUN/CREAT RATIO           17                    GFR if African American >60 >60 ml/min/1.73m2    GFR if not African American 51 (L) >60 ml/min/1.73m2   URINE MICROALBUMIN/CREATININE RATIO      Result  Value Ref Range    PROTEIN QUANT,RAND URINE 41 (H) 1 - 14 mg/dL    CREAT,RANDOM URINE 85.1 47.0 - 110.0 mg/dL    PROT/CREAT RATIO,UR       0.5        Your hemoglobin A1c is unchanged from when last checked.  I do not recommend any medication changes at this time.    Please let me know what you find out from [facility] about a talking glucometer.  I can sen in a prescription for this if needed.    Sincerely,  Della Mauricio MD [facility] 2017 12:25 PM

## 2018-07-24 NOTE — PROGRESS NOTES
Patient Information     Patient Name  Julia Martin MRN  [id number] Sex  Female   1922      Letter by Della Paz MD at      Author:  Della Paz MD Service:  (none) Author Type:  (none)    Filed:   Encounter Date:  4/10/2017 Status:  (Other)           Julia Martin  [address]  [address]  [place] [address]          2017    Dear Ms. Martin:    Here is a copy of your recent labwork:    Results for orders placed or performed in visit on 04/10/17      Hgb A1c      Result  Value Ref Range    HEMOGLOBIN A1C MONITORING (POCT) 7.8 (H) 4.0 - 6.2 %    ESTIMATED AVERAGE GLUCOSE  177 mg/dL     This is fairly stable from your last check.  I do not recommend any medication changes at this time.    Sincerely,  Della Mauricio MD [facility] 2017 7:47 AM

## 2018-09-10 DIAGNOSIS — E11.8 TYPE 2 DIABETES MELLITUS WITH COMPLICATION, WITHOUT LONG-TERM CURRENT USE OF INSULIN (H): ICD-10-CM

## 2018-09-12 RX ORDER — GLIPIZIDE 5 MG/1
TABLET ORAL
Qty: 90 TABLET | Refills: 3 | Status: SHIPPED | OUTPATIENT
Start: 2018-09-12 | End: 2019-01-04

## 2018-09-12 NOTE — TELEPHONE ENCOUNTER
Glucotrol  LOV-04/23/[id number] in 6 months, after the election    Routing refill request to provider for review/approval because: Labs not current:         Patient has documented LDL within the past 12 mos.           Recent Labs   Lab Test  01/03/17   1205   LDL  82                  Patient has had a Microalbumin in the past 12 mos.           Recent Labs   Lab Test  08/29/16   2320   [id number]  14.0           Unable to complete prescription refill per RNMedication Refill Policy.................... Mary Paredes ....................  9/12/2018   3:09 PM

## 2018-09-13 DIAGNOSIS — E11.8 TYPE 2 DIABETES MELLITUS WITH COMPLICATION, WITHOUT LONG-TERM CURRENT USE OF INSULIN (H): Primary | ICD-10-CM

## 2018-09-13 RX ORDER — LANCETS
EACH MISCELLANEOUS
Qty: 102 EACH | Refills: 2 | Status: SHIPPED | OUTPATIENT
Start: 2018-09-13

## 2018-09-13 NOTE — TELEPHONE ENCOUNTER
DM Supplies  LOV-04/23/2018  Prescription refilled per RN Medication RefillPolicy.................... Mary Paredes ....................  9/13/2018   10:46 AM

## 2018-10-09 ENCOUNTER — APPOINTMENT (OUTPATIENT)
Dept: GENERAL RADIOLOGY | Facility: OTHER | Age: 83
End: 2018-10-09
Attending: PHYSICIAN ASSISTANT
Payer: MEDICARE

## 2018-10-09 ENCOUNTER — HOSPITAL ENCOUNTER (EMERGENCY)
Facility: OTHER | Age: 83
Discharge: HOME | End: 2018-10-09
Attending: PHYSICIAN ASSISTANT | Admitting: PHYSICIAN ASSISTANT
Payer: MEDICARE

## 2018-10-09 VITALS
RESPIRATION RATE: 19 BRPM | TEMPERATURE: 98.5 F | SYSTOLIC BLOOD PRESSURE: 107 MMHG | DIASTOLIC BLOOD PRESSURE: 64 MMHG | HEIGHT: 60 IN | BODY MASS INDEX: 24.15 KG/M2 | HEART RATE: 77 BPM | WEIGHT: 123 LBS | OXYGEN SATURATION: 97 %

## 2018-10-09 DIAGNOSIS — K59.01 SLOW TRANSIT CONSTIPATION: ICD-10-CM

## 2018-10-09 DIAGNOSIS — K04.7 DENTAL ABSCESS: ICD-10-CM

## 2018-10-09 DIAGNOSIS — J18.9 PNEUMONIA OF RIGHT MIDDLE LOBE DUE TO INFECTIOUS ORGANISM: ICD-10-CM

## 2018-10-09 LAB
ALBUMIN SERPL-MCNC: 4.2 G/DL (ref 3.5–5.7)
ALBUMIN UR-MCNC: 100 MG/DL
ALP SERPL-CCNC: 55 U/L (ref 34–104)
ALT SERPL W P-5'-P-CCNC: 14 U/L (ref 7–52)
ANION GAP SERPL CALCULATED.3IONS-SCNC: 13 MMOL/L (ref 3–14)
APPEARANCE UR: CLEAR
AST SERPL W P-5'-P-CCNC: 18 U/L (ref 13–39)
BASOPHILS # BLD AUTO: 0 10E9/L (ref 0–0.2)
BASOPHILS NFR BLD AUTO: 0.4 %
BILIRUB SERPL-MCNC: 0.8 MG/DL (ref 0.3–1)
BILIRUB UR QL STRIP: NEGATIVE
BUN SERPL-MCNC: 15 MG/DL (ref 7–25)
CALCIUM SERPL-MCNC: 9.4 MG/DL (ref 8.6–10.3)
CHLORIDE SERPL-SCNC: 97 MMOL/L (ref 98–107)
CO2 SERPL-SCNC: 22 MMOL/L (ref 21–31)
COLOR UR AUTO: YELLOW
CREAT SERPL-MCNC: 0.84 MG/DL (ref 0.6–1.2)
DIFFERENTIAL METHOD BLD: ABNORMAL
EOSINOPHIL # BLD AUTO: 0 10E9/L (ref 0–0.7)
EOSINOPHIL NFR BLD AUTO: 0.1 %
ERYTHROCYTE [DISTWIDTH] IN BLOOD BY AUTOMATED COUNT: 13.2 % (ref 10–15)
GFR SERPL CREATININE-BSD FRML MDRD: 63 ML/MIN/1.7M2
GLUCOSE SERPL-MCNC: 185 MG/DL (ref 70–105)
GLUCOSE UR STRIP-MCNC: NEGATIVE MG/DL
HCT VFR BLD AUTO: 42.4 % (ref 35–47)
HGB BLD-MCNC: 14.1 G/DL (ref 11.7–15.7)
HGB UR QL STRIP: ABNORMAL
IMM GRANULOCYTES # BLD: 0 10E9/L (ref 0–0.4)
IMM GRANULOCYTES NFR BLD: 0.3 %
KETONES UR STRIP-MCNC: 40 MG/DL
LEUKOCYTE ESTERASE UR QL STRIP: NEGATIVE
LYMPHOCYTES # BLD AUTO: 0.7 10E9/L (ref 0.8–5.3)
LYMPHOCYTES NFR BLD AUTO: 7.2 %
MCH RBC QN AUTO: 29.4 PG (ref 26.5–33)
MCHC RBC AUTO-ENTMCNC: 33.3 G/DL (ref 31.5–36.5)
MCV RBC AUTO: 88 FL (ref 78–100)
MONOCYTES # BLD AUTO: 0.5 10E9/L (ref 0–1.3)
MONOCYTES NFR BLD AUTO: 5.4 %
NEUTROPHILS # BLD AUTO: 8.7 10E9/L (ref 1.6–8.3)
NEUTROPHILS NFR BLD AUTO: 86.6 %
NITRATE UR QL: NEGATIVE
NON-SQ EPI CELLS #/AREA URNS LPF: NORMAL /LPF
NT-PROBNP SERPL-MCNC: 524 PG/ML (ref 0–100)
PH UR STRIP: 6.5 PH (ref 5–9)
PLATELET # BLD AUTO: 184 10E9/L (ref 150–450)
POTASSIUM SERPL-SCNC: 3.8 MMOL/L (ref 3.5–5.1)
PROCALCITONIN SERPL-MCNC: 0.1 NG/ML
PROT SERPL-MCNC: 8.2 G/DL (ref 6.4–8.9)
RBC # BLD AUTO: 4.8 10E12/L (ref 3.8–5.2)
RBC #/AREA URNS AUTO: NORMAL /HPF
SODIUM SERPL-SCNC: 132 MMOL/L (ref 134–144)
SOURCE: ABNORMAL
SP GR UR STRIP: 1.02 (ref 1–1.03)
TROPONIN I SERPL-MCNC: <0.03 UG/L (ref 0–0.03)
UROBILINOGEN UR STRIP-ACNC: 0.2 EU/DL (ref 0.2–1)
WBC # BLD AUTO: 10.1 10E9/L (ref 4–11)
WBC #/AREA URNS AUTO: NORMAL /HPF

## 2018-10-09 PROCEDURE — 25000128 H RX IP 250 OP 636: Performed by: PHYSICIAN ASSISTANT

## 2018-10-09 PROCEDURE — 85025 COMPLETE CBC W/AUTO DIFF WBC: CPT | Performed by: PHYSICIAN ASSISTANT

## 2018-10-09 PROCEDURE — 99285 EMERGENCY DEPT VISIT HI MDM: CPT | Mod: 25 | Performed by: PHYSICIAN ASSISTANT

## 2018-10-09 PROCEDURE — 36415 COLL VENOUS BLD VENIPUNCTURE: CPT | Performed by: PHYSICIAN ASSISTANT

## 2018-10-09 PROCEDURE — 71046 X-RAY EXAM CHEST 2 VIEWS: CPT

## 2018-10-09 PROCEDURE — 93010 ELECTROCARDIOGRAM REPORT: CPT | Performed by: INTERNAL MEDICINE

## 2018-10-09 PROCEDURE — 84145 PROCALCITONIN (PCT): CPT | Performed by: PHYSICIAN ASSISTANT

## 2018-10-09 PROCEDURE — 81001 URINALYSIS AUTO W/SCOPE: CPT | Performed by: PHYSICIAN ASSISTANT

## 2018-10-09 PROCEDURE — 83880 ASSAY OF NATRIURETIC PEPTIDE: CPT | Performed by: PHYSICIAN ASSISTANT

## 2018-10-09 PROCEDURE — 99285 EMERGENCY DEPT VISIT HI MDM: CPT | Mod: Z6 | Performed by: PHYSICIAN ASSISTANT

## 2018-10-09 PROCEDURE — A9270 NON-COVERED ITEM OR SERVICE: HCPCS | Mod: GY | Performed by: PHYSICIAN ASSISTANT

## 2018-10-09 PROCEDURE — 74019 RADEX ABDOMEN 2 VIEWS: CPT

## 2018-10-09 PROCEDURE — 84484 ASSAY OF TROPONIN QUANT: CPT | Performed by: PHYSICIAN ASSISTANT

## 2018-10-09 PROCEDURE — 96375 TX/PRO/DX INJ NEW DRUG ADDON: CPT | Performed by: PHYSICIAN ASSISTANT

## 2018-10-09 PROCEDURE — 96366 THER/PROPH/DIAG IV INF ADDON: CPT | Performed by: PHYSICIAN ASSISTANT

## 2018-10-09 PROCEDURE — 96365 THER/PROPH/DIAG IV INF INIT: CPT | Performed by: PHYSICIAN ASSISTANT

## 2018-10-09 PROCEDURE — 25000132 ZZH RX MED GY IP 250 OP 250 PS 637: Mod: GY | Performed by: PHYSICIAN ASSISTANT

## 2018-10-09 PROCEDURE — 80053 COMPREHEN METABOLIC PANEL: CPT | Performed by: PHYSICIAN ASSISTANT

## 2018-10-09 RX ORDER — AMOXICILLIN AND CLAVULANATE POTASSIUM 500; 125 MG/1; MG/1
1 TABLET, FILM COATED ORAL 2 TIMES DAILY
Qty: 20 TABLET | Refills: 0 | Status: SHIPPED | OUTPATIENT
Start: 2018-10-09 | End: 2018-11-06

## 2018-10-09 RX ORDER — ASPIRIN 81 MG
100 TABLET, DELAYED RELEASE (ENTERIC COATED) ORAL
Qty: 10 TABLET | Refills: 0 | Status: SHIPPED | OUTPATIENT
Start: 2018-10-09 | End: 2020-02-26

## 2018-10-09 RX ORDER — ONDANSETRON 2 MG/ML
4 INJECTION INTRAMUSCULAR; INTRAVENOUS EVERY 30 MIN PRN
Status: DISCONTINUED | OUTPATIENT
Start: 2018-10-09 | End: 2018-10-10 | Stop reason: HOSPADM

## 2018-10-09 RX ORDER — CEFTRIAXONE SODIUM 1 G/50ML
1 INJECTION, SOLUTION INTRAVENOUS ONCE
Status: COMPLETED | OUTPATIENT
Start: 2018-10-09 | End: 2018-10-09

## 2018-10-09 RX ORDER — SODIUM CHLORIDE 9 MG/ML
INJECTION, SOLUTION INTRAVENOUS CONTINUOUS
Status: DISCONTINUED | OUTPATIENT
Start: 2018-10-09 | End: 2018-10-10 | Stop reason: HOSPADM

## 2018-10-09 RX ORDER — AZITHROMYCIN 500 MG/5ML
500 INJECTION, POWDER, LYOPHILIZED, FOR SOLUTION INTRAVENOUS ONCE
Status: COMPLETED | OUTPATIENT
Start: 2018-10-09 | End: 2018-10-09

## 2018-10-09 RX ADMIN — MAGNESIUM CITRATE 286 ML: 1.75 LIQUID ORAL at 21:38

## 2018-10-09 RX ADMIN — SODIUM CHLORIDE: 900 INJECTION, SOLUTION INTRAVENOUS at 19:18

## 2018-10-09 RX ADMIN — AZITHROMYCIN MONOHYDRATE 500 MG: 500 INJECTION, POWDER, LYOPHILIZED, FOR SOLUTION INTRAVENOUS at 21:55

## 2018-10-09 RX ADMIN — ONDANSETRON 4 MG: 2 INJECTION INTRAMUSCULAR; INTRAVENOUS at 19:18

## 2018-10-09 RX ADMIN — CEFTRIAXONE SODIUM 1 G: 1 INJECTION, SOLUTION INTRAVENOUS at 21:17

## 2018-10-09 NOTE — ED AVS SNAPSHOT
[facility]    [address]    [place] [address]    Phone:  [phone]    Fax:  [phone]                                       Julia Martin   MRN: [id number]    Department:  [facility]   Date of Visit:  10/9/2018           After Visit Summary Signature Page     I have received my discharge instructions, and my questions have been answered. I have discussed any challenges I see with this plan with the nurse or doctor.    ..........................................................................................................................................  Patient/Patient Representative Signature      ..........................................................................................................................................  Patient Representative Print Name and Relationship to Patient    ..................................................               ................................................  Date                                   Time    ..........................................................................................................................................  Reviewed by Signature/Title    ...................................................              ..............................................  Date                                               Time          22EPIC Rev 08/18

## 2018-10-09 NOTE — ED TRIAGE NOTES
Pt comes in after nursing facility has found pt to be hypertensive into 190's/110's. Pt has a tooth which needs to be extracted. Pt reports having slight HA, intermittent nausea. Pt has visual changes for several weeks. Pt was exercising today and then has felt fatigued since.    Amber Klein RN on 10/9/2018 at 6:41 PM

## 2018-10-09 NOTE — ED AVS SNAPSHOT
[facility]    [address]    [place] [address]    Phone:  [phone]    Fax:  [phone]                                       Julia Martin   MRN: [id number]    Department:  [facility]   Date of Visit:  10/9/2018           Patient Information     Date Of Birth          1/9/1922        Your diagnoses for this visit were:     Pneumonia of right middle lobe due to infectious organism (H)     Slow transit constipation     Dental abscess        You were seen by Herrera Gagnon PA-C.      Follow-up Information     Schedule an appointment as soon as possible for a visit with Della Rhodes MD.    Specialty:  Family Practice    Why:  As needed, If symptoms worsen    Contact information:    [address]  [place] [address]  [phone]          Discharge Instructions         Dental Abscess  An abscess is a sac of pus. A dental abscess forms when a tooth or the tissue around it becomes infected with bacteria. The bacteria can enter through a cavity or a crack in a tooth. It can also infect the gum tissue or bone around a tooth. An untreated abscess can cause the loss of the tooth. It can even spread to other parts of the body and become life-threatening.    Symptoms of a dental abscess   Signs of a dental abscess include:    Toothache, often severe    Tooth pain with hot, cold, or pressure    Pain in the gums, cheek, or jaw    Bad breath or bitter taste in the mouth    Trouble swallowing or opening the mouth    Fever    Swollen or enlarged glands in the neck  Diagnosing a dental abscess  An abscess is diagnosed by looking at your teeth and gums. You will be told if any tests are needed, such as dental X-rays.  Treating a dental abscess  Treatments for a dental abscess may include the following:    Antibiotic medicines. These treat the underlying infection.    Pain relievers. These help you feel more comfortable. Your healthcare  provider may prescribe a medicine for you. Or you may use over-the-counter pain relievers, such as acetaminophen or ibuprofen.    Warm saltwater rinses. These can soothe discomfort and help clear away pus.    Root canal surgery.  This may be done if needed to save the tooth. With a root canal, the infected part of the tooth is removed. A special substance is then used to fill the empty space in the tooth.    Draining the abscess. This may be doneif needed. Incisions are made to allow the infected material to drain from the tooth.    Removing the tooth. This is done in cases of severe infection that can t be treated another way.  You may need to be admitted to a hospital if the infection is severe, has spread, or doesn t respond to treatment.     When to call the dentist  Call your dentist right away if you have any of the following:    Fever of 100.4 F (38 C) or higher    Increased pain, redness, drainage, or swelling in the treated area    Swelling of the face or jawbone    Pain that can't be controlled with medicines   Preventing dental abscess  To prevent another abscess in the future, keep your teeth clean and healthy. Brush twice a day and floss at least once daily. See your dentist for regular tooth cleanings. And stay away from sugary foods and drinks that can lead to tooth decay.  Date Last Reviewed: 6/1/2017 2000-2017 [employer]. [address] [place] [address], [place] [id number]. All rights reserved. This information is not intended as a substitute for professional medical care. Always follow your healthcare professional's instructions.          Constipation (Adult)  Constipation means that you have bowel movements that are less frequent than usual. Stools often become very hard and difficult to pass.  Constipation is very common. At some point in life it affects almost everyone. Since everyone's bowel habits are different, what is constipation to one person may not be to another. Your healthcare  provider may do tests to diagnose constipation. It depends on what he or she finds when evaluating you.    Symptoms of constipation include:    Abdominal pain    Bloating    Vomiting    Painful bowel movements    Itching, swelling, bleeding, or pain around the anus  Causes  Constipation can have many causes. These include:    Diet low in fiber    Too much dairy    Not drinking enough liquids    Lack of exercise or physical activity. This is especially true for older adults.    Changes in lifestyle or daily routine, including pregnancy, aging, work, and travel    Frequent use or misuse of laxatives    Ignoring the urge to have a bowel movement or delaying it until later    Medicines, such as certain prescription pain medicines, iron supplements, antacids, certain antidepressants, and calcium supplements    Diseases like irritable bowel syndrome, bowel obstructions, stroke, diabetes, thyroid disease, Parkinson disease, hemorrhoids, and colon cancer  Complications  Potential complications of constipation can include:    Hemorrhoids    Rectal bleeding from hemorrhoids or anal fissures (skin tears)    Hernias    Dependency on laxatives    Chronic constipation    Fecal impaction    Bowel obstruction or perforation  Home care  All treatment should be done after talking with your healthcare provider. This is especially true if you have another medical problems, are taking prescription medicines, or are an older adult. Treatment most often involves lifestyle changes. You may also need medicines. Your healthcare provider will tell you which will work best for you. Follow the advice below to help avoid this problem in the future.  Lifestyle changes  These lifestyle changes can help prevent constipation:    Diet. Eat a high-fiber diet, with fresh fruit and vegetables, and reduce dairy intake, meats, and processed foods    Fluids. It's important to get enough fluids each day. Drink plenty of water when you eat more fiber. If you  are on diet that limits the amount of fluid you can have, talk about this with your healthcare provider.    Regular exercise. Check with your healthcare provider first.  Medicines  Take any medicines as directed. Some laxatives are safe to use only every now and then. Others can be taken on a regular basis. Talk with your doctor or pharmacist if you have questions.  Prescription pain medicines can cause constipation. If you are taking this kind of medicine, ask your healthcare provider if you should also take a stool softener.  Medicines you may take to treat constipation include:    Fiber supplements    Stool softeners    Laxatives    Enemas    Rectal suppositories  Follow-up care  Follow up with your healthcare provider if symptoms don't get better in the next few days. You may need to have more tests or see a specialist.  Call 911  Call 911 if any of these occur:    Trouble breathing    Stiff, rigid abdomen that is severely painful to touch    Confusion    Fainting or loss of consciousness    Rapid heart rate    Chest pain  When to seek medical advice  Call your healthcare provider right away if any of these occur:    Fever of 100.4 F (38 C) or higher, or as directed by your healthcare provider    Failure to resume normal bowel movements    Pain in your abdomen or back gets worse    Nausea or vomiting    Swelling in your abdomen    Blood in the stool    Black, tarry stool    Involuntary weight loss    Weakness  Date Last Reviewed: 12/30/2015 2000-2017 [employer]. [address] [place] [address], [place] [address]. All rights reserved. This information is not intended as a substitute for professional medical care. Always follow your healthcare professional's instructions.          Pneumonia (Adult)  Pneumonia is an infection deep within the lungs. It is in the small air sacs (alveoli). Pneumonia may be caused by a virus or bacteria. Pneumonia caused by bacteria is usually treated with an antibiotic.  Severe cases may need to be treated in the hospital. Milder cases can be treated at home. Symptoms usually start to get better during the first 2 days of treatment.    Home care  Follow these guidelines when caring for yourself at home:    Rest at home for the first 2 to 3 days, or until you feel stronger. Don t let yourself get overly tired when you go back to your activities.    Stay away from cigarette smoke - yours or other people s.    You may use acetaminophen or ibuprofen to control fever or pain, unless another medicine was prescribed. If you have chronic liver or kidney disease, talk with your healthcare provider before using these medicines. Also talk with your provider if you ve had a stomach ulcer or gastrointestinal bleeding. Don t give aspirin to anyone younger than 18 years of age who is ill with a fever. It may cause severe liver damage.    Your appetite may be poor, so a light diet is fine.    Drink 6 to 8 glasses of fluids every day to make sure you are getting enough fluids. Beverages can include water, sport drinks, sodas without caffeine, juices, tea, or soup. Fluids will help loosen secretions in the lung. This will make it easier for you to cough up the phlegm (sputum). If you also have heart or kidney disease, check with your healthcare provider before you drink extra fluids.    Take antibiotic medicine prescribed until it is all gone, even if you are feeling better after a few days.  Follow-up care  Follow up with your healthcare provider in the next 2 to 3 days, or as advised. This is to be sure the medicine is helping you get better.  If you are 65 or older, you should get a pneumococcal vaccine and a yearly flu (influenza) shot. You should also get these vaccines if you have chronic lung disease like asthma, emphysema, or COPD. Recently, a second type of pneumonia vaccine has become available for everyone over 65 years old. This is in addition to the previous vaccine. Ask your provider  about this.  When to seek medical advice  Call your healthcare provider right away if any of these occur:    You don t get better within the first 48 hours of treatment    Shortness of breath gets worse    Rapid breathing (more than 25 breaths per minute)    Coughing up blood    Chest pain gets worse with breathing    Fever of 100.4 F (38 C) or higher that doesn t get better with fever medicine    Weakness, dizziness, or fainting that gets worse    Thirst or dry mouth that gets worse    Sinus pain, headache, or a stiff neck    Chest pain not caused by coughing  Date Last Reviewed: 1/1/2017 2000-2017 [employer]. [address] [place] [address]. All rights reserved. This information is not intended as a substitute for professional medical care. Always follow your healthcare professional's instructions.          Your next 10 appointments already scheduled     Oct 16, 2018  1:30 PM CDT   Office Visit with Della Santos MD   [facility] ([facility])    [address]  [place] [id number] [phone]           Bring a current list of meds and any records pertaining to this visit. For Physicals, please bring immunization records and any forms needing to be filled out. Please arrive 10 minutes early to complete paperwork.              24 Hour Appointment Hotline     To schedule an appointment at [facility], please call [phone]. If you don't have a family doctor or clinic, we will help you find one. [facility] are conveniently located to serve the needs of you and your family.           Review of your medicines      START taking        Dose / Directions Last dose taken    amoxicillin-clavulanate 500-125 MG per tablet   Commonly known as:  AUGMENTIN   Dose:  1 tablet   Quantity:  20 tablet        Take 1 tablet by mouth 2 times daily   Refills:  0        docusate sodium 100 MG tablet   Commonly known as:  COLACE    Dose:  100 mg   Quantity:  10 tablet        Take 100 mg by mouth 2 times daily for 5 days   Refills:  0          Our records show that you are taking the medicines listed below. If these are incorrect, please call your family doctor or clinic.        Dose / Directions Last dose taken    aspirin 325 MG EC tablet   Dose:  325 mg        Take 325 mg by mouth daily with food   Refills:  0        GLENIS CONTOUR test strip   Generic drug:  blood glucose monitoring        Test blood sugars once per day Up to 2 times daily if necessary.  Dx: E11.9   Refills:  0        * blood glucose monitoring lancets        Use to test blood sugars one to two times daily as directed.  Dx: E11.9   Refills:  0        * blood glucose monitoring lancets   Quantity:  102 each        Use to test blood sugars one to two times daily as directed. Dx: E11.9   Refills:  2        glipiZIDE 5 MG tablet   Commonly known as:  GLUCOTROL   Quantity:  90 tablet        TAKE 2 TABLETS BY MOUTH EVERY DAY WITH BREAKFAST, THEN TAKE 1 TABLETAT SUPPER TIME   Refills:  3        PRESERVISION AREDS Caps        Refills:  0        RA VITAMIN B-12 TR 1000 MCG Tbcr   Dose:  1000 mcg   Generic drug:  cyanocobalamin        Take 1,000 mcg by mouth daily   Refills:  0        roller walker Misc        Rolling Walker for home use. With seat.   Refills:  0        simvastatin 10 MG tablet   Commonly known as:  ZOCOR   Dose:  10 mg   Quantity:  90 tablet        Take 1 tablet (10 mg) by mouth At Bedtime   Refills:  3        sitagliptin 50 MG tablet   Commonly known as:  JANUVIA   Dose:  50 mg   Quantity:  90 tablet        Take 1 tablet (50 mg) by mouth daily Take 50 mg by mouth daily   Refills:  3        vitamin D3 1000 units Caps   Dose:  1000 Units        Take 1,000 Units by mouth daily   Refills:  0        * Notice:  This list has 2 medication(s) that are the same as other medications prescribed for you. Read the directions carefully, and ask your doctor or other care provider  to review them with you.            Prescriptions were sent or printed at these locations (2 Prescriptions)                   [id number] - [place], [place] - [address]   [address] [place] [place] [address]    Telephone:  [phone]   Fax:  [phone]   Hours:                  Printed at Department/Unit printer (2 of 2)         amoxicillin-clavulanate (AUGMENTIN) 500-125 MG per tablet               docusate sodium (COLACE) 100 MG tablet                Procedures and tests performed during your visit     *UA reflex to Microscopic    CBC with platelets differential    Comprehensive metabolic panel    EKG 12-lead, tracing only    Nt probnp inpatient (BNP)    Procalcitonin    Troponin I    Urine Microscopic    XR Abdomen 2 Views    XR Chest 2 Views      Orders Needing Specimen Collection     None      Pending Results     No orders found from 10/7/2018 to 10/10/2018.            Pending Culture Results     No orders found from 10/7/2018 to 10/10/2018.            Pending Results Instructions     If you had any lab results that were not finalized at the time of your Discharge, you can call the ED Lab Result RN at [phone]. You will be contacted by this team for any positive Lab results or changes in treatment. The nurses are available 7 days a week from 10A to 6:30P.  You can leave a message 24 hours per day and they will return your call.        Thank you for choosing [facility]       Thank you for choosing [facility] for your care. Our goal is always to provide you with excellent care. Hearing back from our patients is one way we can continue to improve our services. Please take a few minutes to complete the written survey that you may receive in the mail after you visit with us. Thank you!        [facility] Information     [employer] lets you send messages to your doctor, view your test results, renew your prescriptions, schedule appointments and more. To sign up, go to  [email or web address] . Click on \"Log in\" on the left side of the screen, which will take you to the Welcome page. Then click on \"Sign up Now\" on the right side of the page.     You will be asked to enter the access code listed below, as well as some personal information. Please follow the directions to create your username and password.     Your access code is: [email or web address]  Expires: 2019 11:02 PM     Your access code will  in 90 days. If you need help or a new code, please call your [facility] clinic or [phone].        Care EveryWhere ID     This is your Care EveryWhere ID. This could be used by other organizations to access your [facility] medical records  [id number]        Equal Access to Services     HANSEL CLAIRE : Patsy Ponce, shiva dang, jeremiah mcintyre, monica jarvis. So [facility] [place] [facility] [phone].    ATENCIÓN: Si habla español, tiene a temple disposición servicios gratuitos de asistencia lingüística. Milena al [phone].    We comply with applicable federal civil rights laws and [place] laws. We do not discriminate on the basis of race, color, national origin, age, disability, sex, sexual orientation, or gender identity.            After Visit Summary       This is your record. Keep this with you and show to your community pharmacist(s) and doctor(s) at your next visit.                  "

## 2018-10-10 ASSESSMENT — ENCOUNTER SYMPTOMS
COLOR CHANGE: 0
ABDOMINAL PAIN: 1
DIFFICULTY URINATING: 0
EYE REDNESS: 0
SHORTNESS OF BREATH: 0
CHILLS: 1
VOMITING: 0
FEVER: 1
NAUSEA: 1
FATIGUE: 1
DIARRHEA: 0
COUGH: 1
ARTHRALGIAS: 0
CONFUSION: 0
BLOOD IN STOOL: 0
WEAKNESS: 1
NECK STIFFNESS: 0
HEADACHES: 0

## 2018-10-10 NOTE — DISCHARGE INSTRUCTIONS
Dental Abscess  An abscess is a sac of pus. A dental abscess forms when a tooth or the tissue around it becomes infected with bacteria. The bacteria can enter through a cavity or a crack in a tooth. It can also infect the gum tissue or bone around a tooth. An untreated abscess can cause the loss of the tooth. It can even spread to other parts of the body and become life-threatening.    Symptoms of a dental abscess   Signs of a dental abscess include:    Toothache, often severe    Tooth pain with hot, cold, or pressure    Pain in the gums, cheek, or jaw    Bad breath or bitter taste in the mouth    Trouble swallowing or opening the mouth    Fever    Swollen or enlarged glands in the neck  Diagnosing a dental abscess  An abscess is diagnosed by looking at your teeth and gums. You will be told if any tests are needed, such as dental X-rays.  Treating a dental abscess  Treatments for a dental abscess may include the following:    Antibiotic medicines. These treat the underlying infection.    Pain relievers. These help you feel more comfortable. Your healthcare provider may prescribe a medicine for you. Or you may use over-the-counter pain relievers, such as acetaminophen or ibuprofen.    Warm saltwater rinses. These can soothe discomfort and help clear away pus.    Root canal surgery.  This may be done if needed to save the tooth. With a root canal, the infected part of the tooth is removed. A special substance is then used to fill the empty space in the tooth.    Draining the abscess. This may be doneif needed. Incisions are made to allow the infected material to drain from the tooth.    Removing the tooth. This is done in cases of severe infection that can t be treated another way.  You may need to be admitted to a hospital if the infection is severe, has spread, or doesn t respond to treatment.     When to call the dentist  Call your dentist right away if you have any of the following:    Fever of 100.4 F (38 C) or  higher    Increased pain, redness, drainage, or swelling in the treated area    Swelling of the face or jawbone    Pain that can't be controlled with medicines   Preventing dental abscess  To prevent another abscess in the future, keep your teeth clean and healthy. Brush twice a day and floss at least once daily. See your dentist for regular tooth cleanings. And stay away from sugary foods and drinks that can lead to tooth decay.  Date Last Reviewed: 6/1/2017 2000-2017 [employer]. [address] [place] [address]. All rights reserved. This information is not intended as a substitute for professional medical care. Always follow your healthcare professional's instructions.          Constipation (Adult)  Constipation means that you have bowel movements that are less frequent than usual. Stools often become very hard and difficult to pass.  Constipation is very common. At some point in life it affects almost everyone. Since everyone's bowel habits are different, what is constipation to one person may not be to another. Your healthcare provider may do tests to diagnose constipation. It depends on what he or she finds when evaluating you.    Symptoms of constipation include:    Abdominal pain    Bloating    Vomiting    Painful bowel movements    Itching, swelling, bleeding, or pain around the anus  Causes  Constipation can have many causes. These include:    Diet low in fiber    Too much dairy    Not drinking enough liquids    Lack of exercise or physical activity. This is especially true for older adults.    Changes in lifestyle or daily routine, including pregnancy, aging, work, and travel    Frequent use or misuse of laxatives    Ignoring the urge to have a bowel movement or delaying it until later    Medicines, such as certain prescription pain medicines, iron supplements, antacids, certain antidepressants, and calcium supplements    Diseases like irritable bowel syndrome, bowel  obstructions, stroke, diabetes, thyroid disease, Parkinson disease, hemorrhoids, and colon cancer  Complications  Potential complications of constipation can include:    Hemorrhoids    Rectal bleeding from hemorrhoids or anal fissures (skin tears)    Hernias    Dependency on laxatives    Chronic constipation    Fecal impaction    Bowel obstruction or perforation  Home care  All treatment should be done after talking with your healthcare provider. This is especially true if you have another medical problems, are taking prescription medicines, or are an older adult. Treatment most often involves lifestyle changes. You may also need medicines. Your healthcare provider will tell you which will work best for you. Follow the advice below to help avoid this problem in the future.  Lifestyle changes  These lifestyle changes can help prevent constipation:    Diet. Eat a high-fiber diet, with fresh fruit and vegetables, and reduce dairy intake, meats, and processed foods    Fluids. It's important to get enough fluids each day. Drink plenty of water when you eat more fiber. If you are on diet that limits the amount of fluid you can have, talk about this with your healthcare provider.    Regular exercise. Check with your healthcare provider first.  Medicines  Take any medicines as directed. Some laxatives are safe to use only every now and then. Others can be taken on a regular basis. Talk with your doctor or pharmacist if you have questions.  Prescription pain medicines can cause constipation. If you are taking this kind of medicine, ask your healthcare provider if you should also take a stool softener.  Medicines you may take to treat constipation include:    Fiber supplements    Stool softeners    Laxatives    Enemas    Rectal suppositories  Follow-up care  Follow up with your healthcare provider if symptoms don't get better in the next few days. You may need to have more tests or see a specialist.  Call 911  Call 911 if any  of these occur:    Trouble breathing    Stiff, rigid abdomen that is severely painful to touch    Confusion    Fainting or loss of consciousness    Rapid heart rate    Chest pain  When to seek medical advice  Call your healthcare provider right away if any of these occur:    Fever of 100.4 F (38 C) or higher, or as directed by your healthcare provider    Failure to resume normal bowel movements    Pain in your abdomen or back gets worse    Nausea or vomiting    Swelling in your abdomen    Blood in the stool    Black, tarry stool    Involuntary weight loss    Weakness  Date Last Reviewed: 12/30/2015 2000-2017 [employer]. [address] [place] [id number]. All rights reserved. This information is not intended as a substitute for professional medical care. Always follow your healthcare professional's instructions.          Pneumonia (Adult)  Pneumonia is an infection deep within the lungs. It is in the small air sacs (alveoli). Pneumonia may be caused by a virus or bacteria. Pneumonia caused by bacteria is usually treated with an antibiotic. Severe cases may need to be treated in the hospital. Milder cases can be treated at home. Symptoms usually start to get better during the first 2 days of treatment.    Home care  Follow these guidelines when caring for yourself at home:    Rest at home for the first 2 to 3 days, or until you feel stronger. Don t let yourself get overly tired when you go back to your activities.    Stay away from cigarette smoke - yours or other people s.    You may use acetaminophen or ibuprofen to control fever or pain, unless another medicine was prescribed. If you have chronic liver or kidney disease, talk with your healthcare provider before using these medicines. Also talk with your provider if you ve had a stomach ulcer or gastrointestinal bleeding. Don t give aspirin to anyone younger than 18 years of age who is ill with a fever. It may cause severe liver  damage.    Your appetite may be poor, so a light diet is fine.    Drink 6 to 8 glasses of fluids every day to make sure you are getting enough fluids. Beverages can include water, sport drinks, sodas without caffeine, juices, tea, or soup. Fluids will help loosen secretions in the lung. This will make it easier for you to cough up the phlegm (sputum). If you also have heart or kidney disease, check with your healthcare provider before you drink extra fluids.    Take antibiotic medicine prescribed until it is all gone, even if you are feeling better after a few days.  Follow-up care  Follow up with your healthcare provider in the next 2 to 3 days, or as advised. This is to be sure the medicine is helping you get better.  If you are 65 or older, you should get a pneumococcal vaccine and a yearly flu (influenza) shot. You should also get these vaccines if you have chronic lung disease like asthma, emphysema, or COPD. Recently, a second type of pneumonia vaccine has become available for everyone over 65 years old. This is in addition to the previous vaccine. Ask your provider about this.  When to seek medical advice  Call your healthcare provider right away if any of these occur:    You don t get better within the first 48 hours of treatment    Shortness of breath gets worse    Rapid breathing (more than 25 breaths per minute)    Coughing up blood    Chest pain gets worse with breathing    Fever of 100.4 F (38 C) or higher that doesn t get better with fever medicine    Weakness, dizziness, or fainting that gets worse    Thirst or dry mouth that gets worse    Sinus pain, headache, or a stiff neck    Chest pain not caused by coughing  Date Last Reviewed: 1/1/2017 2000-2017 [employer]. [address] [place] [address], [place] [address]. All rights reserved. This information is not intended as a substitute for professional medical care. Always follow your healthcare professional's instructions.

## 2018-10-10 NOTE — ED PROVIDER NOTES
History   No chief complaint on file.    HPI Comments: Daughter reports she has had a dental abscess as well she has been is a 89-year-old female is at [facility].  She was noted at her SNF to have some hypertension.  Pressures 190s / 110s.  She is here for further evaluation.  She is been having headaches some nausea she had a kind of a chronic cough that does not seem to be improving.  She reports she was exercising and afterwards felt extremely fatigued she is here for further evaluation.  Her blood pressure is 213/110.  Temp is 99.2 and pulse is 100.  Her daughter reports that she has been having a dental abscess as well and has not been able to see the dentist thus far.    The history is provided by the patient and a relative.         Problem List:    Patient Active Problem List    Diagnosis Date Noted     Type 2 diabetes mellitus with complication, without long-term current use of insulin (H) 08/29/2016     Priority: Medium     Living will on file 09/23/2015     Priority: Medium     B12 deficiency 04/15/2015     Priority: Medium     Cerebral infarction (H) 10/02/2014     Priority: Medium     Diabetic neuropathy, type II diabetes mellitus (H) 04/07/2014     Priority: Medium     ACP (advance care planning) 12/10/2013     Priority: Medium     Carpal tunnel syndrome of left wrist 07/09/2013     Priority: Medium     Macular degeneration (senile) of retina 07/25/2012     Priority: Medium     Actinic keratosis 06/27/2008     Priority: Medium     Pure hypercholesterolemia 11/02/2004     Priority: Medium        Past Medical History:    Past Medical History:   Diagnosis Date     Actinic keratosis      Deficiency of other specified B group vitamins (CODE)      Hearing loss      Personal history of other specified conditions (CODE)        Past Surgical History:    Past Surgical History:   Procedure Laterality Date     COLONOSCOPY      No Comments Provided     EXTRACAPSULAR CATARACT EXTRATION WITH INTRAOCULAR LENS  IMPLANT      1997 and 2001     HYSTERECTOMY TOTAL ABDOMINAL, BILATERAL SALPINGO-OOPHORECTOMY, COMBINED      age 55, fibroids     OTHER SURGICAL HISTORY      50321.0,IN INJ ANTER [facility] EYE MEDICATN,Left,for macular degeneration     RELEASE CARPAL TUNNEL      No Comments Provided     TONSILLECTOMY      age 5       Family History:    Family History   Problem Relation Age of Onset     Breast Cancer Mother      Cancer-breast, breast cancer     Other - See Comments Mother      Stroke,stroke or sudden death     Other - See Comments Daughter       Parkinson's     Other - See Comments Brother       Parkinson's     Other - See Comments Brother       kidney failure     Other - See Comments Sister      Stroke,stroke     Other - See Comments Daughter      ovarian cancer       Social History:  Marital Status:   [5]  Social History   Substance Use Topics     Smoking status: Former Smoker     Smokeless tobacco: Never Used     Alcohol use No        Medications:      glipiZIDE (GLUCOTROL) 5 MG tablet   simvastatin (ZOCOR) 10 MG tablet   sitagliptin (JANUVIA) 50 MG tablet   aspirin  MG EC tablet   blood glucose monitoring (ACCU-CHEK FASTCLIX) lancets   blood glucose monitoring (GLENIS CONTOUR) test strip   blood glucose monitoring (GLENIS MICROLET) lancets   Cholecalciferol (VITAMIN D3) 1000 UNITS CAPS   cyanocobalamin (RA VITAMIN B-12 TR) 1000 MCG TBCR   Misc. Devices (ROLLER WALKER) MISC   Multiple Vitamins-Minerals (PRESERVISION AREDS) CAPS         Review of Systems   Constitutional: Positive for chills, fatigue and fever.   HENT: Positive for dental problem. Negative for congestion.    Eyes: Negative for redness.   Respiratory: Positive for cough. Negative for shortness of breath.    Cardiovascular: Negative for chest pain.   Gastrointestinal: Positive for abdominal pain and nausea. Negative for blood in stool, diarrhea and vomiting.   Genitourinary: Negative for difficulty urinating.   Musculoskeletal: Negative for  arthralgias and neck stiffness.   Skin: Negative for color change.   Neurological: Positive for weakness. Negative for headaches.   Psychiatric/Behavioral: Negative for confusion.       Physical Exam   BP: (!) 213/110  Pulse: 100  Temp: 99.2  F (37.3  C)  Resp: 18  Height: 152.4 cm (5')  Weight: 55.8 kg (123 lb)  SpO2: 95 %    Vitals:    10/09/18 2305 10/09/18 2306 10/09/18 2307 10/09/18 2316   BP: 107/64   107/64   Pulse:    77   Resp: 13 19 21 19   Temp:    98.5  F (36.9  C)   TempSrc:    Tympanic   SpO2:  92% 93% 97%   Weight:       Height:           Physical Exam   Constitutional: No distress.   HENT:   Head: Atraumatic.   Mouth/Throat: Oropharynx is clear and moist. Dental abscesses present. No oropharyngeal exudate.       Dental abscess with gingival swelling and tenderness no obvious drainage.  No facial swelling   Eyes: Pupils are equal, round, and reactive to light. No scleral icterus.   Cardiovascular: Normal heart sounds and intact distal pulses.    Pulmonary/Chest: Breath sounds normal. No respiratory distress.   Lung sounds are a but decreased on the right.   Abdominal: Soft. Bowel sounds are normal. There is no tenderness.   Musculoskeletal: She exhibits no edema or tenderness.   Neurological: She displays no atrophy and no tremor. She exhibits normal muscle tone. She displays no seizure activity. Coordination and gait normal. GCS eye subscore is 4. GCS verbal subscore is 5. GCS motor subscore is 6.   Reflex Scores:       Tricep reflexes are 2+ on the right side and 2+ on the left side.       Bicep reflexes are 2+ on the right side and 2+ on the left side.       Brachioradialis reflexes are 2+ on the right side and 2+ on the left side.       Patellar reflexes are 2+ on the right side and 2+ on the left side.       Achilles reflexes are 2+ on the right side and 2+ on the left side.  No focal neurological deficits.   Skin: Skin is warm. No rash noted. She is not diaphoretic.       ED Course     ED Course      Procedures               EKG Interpretation:      Interpreted by Herrera Garrison  Time reviewed: 1905  Symptoms at time of EKG: HTN   Rhythm: 1 degree AV block  Rate: Normal  Axis: Normal  Ectopy: none  Conduction: normal  ST Segments/ T Waves: Inferior infarct, age undetermined  Q Waves: none  Comparison to prior: Unchanged from 9/17/2014    Clinical Impression: normal EKG           Results for orders placed or performed during the hospital encounter of 10/09/18 (from the past 24 hour(s))   CBC with platelets differential   Result Value Ref Range    WBC 10.1 4.0 - 11.0 10e9/L    RBC Count 4.80 3.8 - 5.2 10e12/L    Hemoglobin 14.1 11.7 - 15.7 g/dL    Hematocrit 42.4 35.0 - 47.0 %    MCV 88 78 - 100 fl    MCH 29.4 26.5 - 33.0 pg    MCHC 33.3 31.5 - 36.5 g/dL    RDW 13.2 10.0 - 15.0 %    Platelet Count 184 150 - 450 10e9/L    Diff Method Automated Method     % Neutrophils 86.6 %    % Lymphocytes 7.2 %    % Monocytes 5.4 %    % Eosinophils 0.1 %    % Basophils 0.4 %    % Immature Granulocytes 0.3 %    Absolute Neutrophil 8.7 (H) 1.6 - 8.3 10e9/L    Absolute Lymphocytes 0.7 (L) 0.8 - 5.3 10e9/L    Absolute Monocytes 0.5 0.0 - 1.3 10e9/L    Absolute Eosinophils 0.0 0.0 - 0.7 10e9/L    Absolute Basophils 0.0 0.0 - 0.2 10e9/L    Abs Immature Granulocytes 0.0 0 - 0.4 10e9/L   Comprehensive metabolic panel   Result Value Ref Range    Sodium 132 (L) 134 - 144 mmol/L    Potassium 3.8 3.5 - 5.1 mmol/L    Chloride 97 (L) 98 - 107 mmol/L    Carbon Dioxide 22 21 - 31 mmol/L    Anion Gap 13 3 - 14 mmol/L    Glucose 185 (H) 70 - 105 mg/dL    Urea Nitrogen 15 7 - 25 mg/dL    Creatinine 0.84 0.60 - 1.20 mg/dL    GFR Estimate 63 >60 mL/min/1.7m2    GFR Estimate If Black 76 >60 mL/min/1.7m2    Calcium 9.4 8.6 - 10.3 mg/dL    Bilirubin Total 0.8 0.3 - 1.0 mg/dL    Albumin 4.2 3.5 - 5.7 g/dL    Protein Total 8.2 6.4 - 8.9 g/dL    Alkaline Phosphatase 55 34 - 104 U/L    ALT 14 7 - 52 U/L    AST 18 13 - 39 U/L   Troponin I   Result  Value Ref Range    Troponin I ES <0.030 0.000 - 0.034 ug/L   Nt probnp inpatient (BNP)   Result Value Ref Range    N-Terminal Pro BNP Inpatient 524 (H) 0 - 100 pg/mL   Procalcitonin   Result Value Ref Range    Procalcitonin 0.10 ng/ml   XR Chest 2 Views    Narrative    Procedure:XR CHEST 2 VW    Clinical history:Female, 89 years, HTN;     Technique: Two views are submitted.    Comparison: None    Findings: The cardiac silhouette is normal. The pulmonary vasculature  is normal.    The lungs demonstrate areas of increased density in the lung bases  suggesting right middle lobe and possibly lingula pneumonia. No  evidence of pleural effusion. Bony structures are unremarkable.      Impression    Impression:  1.   By basilar opacity suggesting right middle lobe and lingular  pneumonia.    2.  Patchy atelectasis within the lingula.    JULIEN NJ MD   XR Abdomen 2 Views    Narrative    XR ABDOMEN 2 VW   10/9/2018 7:55 PM    History:Female,age  89 years, HTN, nauseated;     Comparison: None    FINDINGS: Two views are submitted. Moderate volume of stool is seen  within the colon. There is no evidence of free air or evidence of  obstruction. Postoperative changes are seen along midline in the lower  pelvis.      Impression    IMPRESSION:  1.   Moderate volume of stool, no acute abnormality. No evidence of  free air. No evidence of obstruction.    2.  Prominent lower lumbar degenerative changes with scoliotic  curvature, convex left, apex at L3.    JULIEN NJ MD   *UA reflex to Microscopic   Result Value Ref Range    Color Urine Yellow     Appearance Urine Clear     Glucose Urine Negative NEG^Negative mg/dL    Bilirubin Urine Negative NEG^Negative    Ketones Urine 40 (A) NEG^Negative mg/dL    Specific Gravity Urine 1.025 1.000 - 1.030    Blood Urine Small (A) NEG^Negative    pH Urine 6.5 5.0 - 9.0 pH    Protein Albumin Urine 100 (A) NEG^Negative mg/dL    Urobilinogen Urine 0.2 0.2 - 1.0 EU/dL    Nitrite Urine  Negative NEG^Negative    Leukocyte Esterase Urine Negative NEG^Negative    Source Unspecified Urine    Urine Microscopic   Result Value Ref Range    WBC Urine 0 - 5 OTO5^0 - 5 /HPF    RBC Urine O - 2 OTO2^O - 2 /HPF    Squamous Epithelial /LPF Urine Few FEW^Few /LPF       Medications   pink lady enema (286 mLs Rectal Given 10/9/18 2138)   azithromycin (ZITHROMAX) 500 mg in  mL (500 mg Intravenous Given 10/9/18 2155)   cefTRIAXone in d5w (ROCEPHIN) intermittent infusion 1 g (0 g Intravenous Stopped 10/9/18 2302)       Assessments & Plan (with Medical Decision Making)     I have reviewed the nursing notes.    I have reviewed the findings, diagnosis, plan and need for follow up with the patient.      Discharge Medication List as of 10/9/2018 11:03 PM      START taking these medications    Details   amoxicillin-clavulanate (AUGMENTIN) 500-125 MG per tablet Take 1 tablet by mouth 2 times daily, Disp-20 tablet, R-0, Local Print      docusate sodium (COLACE) 100 MG tablet Take 100 mg by mouth 2 times daily for 5 days, Disp-10 tablet, R-0, Local Print             Final diagnoses:   Pneumonia of right middle lobe due to infectious organism (H)   Slow transit constipation   Dental abscess   Low-grade temp 99.2.  Initial hypertension with blood pressure 213/110.  Cough and nausea with body aches and fatigue.  IV established and she was given fluids CBC shows normal white blood cells but she does have a left shift.  Pro-calcitonin mildly elevated.  Chest x-ray shows lungs demonstrate areas of increased density in the lung bases suggesting right middle lobe and possibly lingula pneumonia.  She was given Rocephin and azithromycin IV.  Abdomen shows moderate volume of stool is seen within the colon.  Slow transition constipation.  She was given a pink lady enema and produced a very large amount of stool.  She reports that she is feeling much better.  UA is unremarkable.  Right middle lobe pneumonia, constipation, and dental  abscess.  We will start her on Augmentin at this time.  Swallows Colace times 5 days for her constipation.  Discussed increase fluid intake and continued monitoring.  Follow-up in the ER for further evaluation as needed.    10/9/2018   [facility]     Herrera Gagnon PA-C  10/10/18 1153

## 2018-10-16 ENCOUNTER — OFFICE VISIT (OUTPATIENT)
Dept: FAMILY MEDICINE | Facility: OTHER | Age: 83
End: 2018-10-16
Attending: FAMILY MEDICINE
Payer: MEDICARE

## 2018-10-16 VITALS
HEART RATE: 76 BPM | WEIGHT: 120 LBS | BODY MASS INDEX: 23.44 KG/M2 | DIASTOLIC BLOOD PRESSURE: 72 MMHG | RESPIRATION RATE: 18 BRPM | SYSTOLIC BLOOD PRESSURE: 136 MMHG

## 2018-10-16 DIAGNOSIS — Z23 NEED FOR PROPHYLACTIC VACCINATION AND INOCULATION AGAINST INFLUENZA: ICD-10-CM

## 2018-10-16 DIAGNOSIS — H35.30 MACULAR DEGENERATION (SENILE) OF RETINA: ICD-10-CM

## 2018-10-16 DIAGNOSIS — E11.40 TYPE 2 DIABETES MELLITUS WITH DIABETIC NEUROPATHY, WITHOUT LONG-TERM CURRENT USE OF INSULIN (H): Primary | ICD-10-CM

## 2018-10-16 DIAGNOSIS — E53.8 B12 DEFICIENCY: ICD-10-CM

## 2018-10-16 LAB
HBA1C MFR BLD: 7.8 % (ref 4–6)
VIT B12 SERPL-MCNC: 834 PG/ML (ref 180–914)

## 2018-10-16 PROCEDURE — 36415 COLL VENOUS BLD VENIPUNCTURE: CPT | Performed by: FAMILY MEDICINE

## 2018-10-16 PROCEDURE — 90662 IIV NO PRSV INCREASED AG IM: CPT | Performed by: FAMILY MEDICINE

## 2018-10-16 PROCEDURE — G0008 ADMIN INFLUENZA VIRUS VAC: HCPCS

## 2018-10-16 PROCEDURE — 99213 OFFICE O/P EST LOW 20 MIN: CPT | Performed by: FAMILY MEDICINE

## 2018-10-16 PROCEDURE — G0463 HOSPITAL OUTPT CLINIC VISIT: HCPCS

## 2018-10-16 PROCEDURE — G0463 HOSPITAL OUTPT CLINIC VISIT: HCPCS | Mod: 25

## 2018-10-16 PROCEDURE — 83036 HEMOGLOBIN GLYCOSYLATED A1C: CPT | Performed by: FAMILY MEDICINE

## 2018-10-16 PROCEDURE — 82607 VITAMIN B-12: CPT | Performed by: FAMILY MEDICINE

## 2018-10-16 ASSESSMENT — PAIN SCALES - GENERAL: PAINLEVEL: NO PAIN (0)

## 2018-10-16 NOTE — PROGRESS NOTES
SUBJECTIVE:   CC:  Julia Martin is a 89 year old female who presents to clinic today for the following health issues:  Diabetes follow up and pneumonia follow up     HPI  Julia Martin is a 89 year old female presents for follow up of type 2 diabetes and pneumonia follow up.  She was diagnosed with pneumonia one week ago - into the ER for this. X-ray and lab results as below.  She said with her initial symptoms, she is more worried about a TIA than a respiratory illness.  She was not coughing or wheezing.  She says her energy is better, not back to normal.  For example, prior to being ill she would exercise and 1 of the machines at her assisted living for 20 minutes a day, now can tolerate only about 5 minutes.  She is not getting chest pain, just more tired.    Type 2 diabetes:  Is on aspirin and statin.  Has had regular eye exams due to macular degeneration.  Lab Results   Component Value Date    A1C 7.5 04/23/2018   Does do home testing.  However, with the home testing, this is more difficult because of her decreased vision.         No Known Allergies  Current Outpatient Prescriptions   Medication     amoxicillin-clavulanate (AUGMENTIN) 500-125 MG per tablet     aspirin  MG EC tablet     blood glucose monitoring (ACCU-CHEK FASTCLIX) lancets     blood glucose monitoring (GLENIS CONTOUR) test strip     blood glucose monitoring (GLENIS MICROLET) lancets     Cholecalciferol (VITAMIN D3) 1000 UNITS CAPS     cyanocobalamin (RA VITAMIN B-12 TR) 1000 MCG TBCR     glipiZIDE (GLUCOTROL) 5 MG tablet     Misc. Devices (ROLLER WALKER) MISC     Multiple Vitamins-Minerals (PRESERVISION AREDS) CAPS     simvastatin (ZOCOR) 10 MG tablet     sitagliptin (JANUVIA) 50 MG tablet     No current facility-administered medications for this visit.       Past Medical History:   Diagnosis Date     Actinic keratosis     No Comments Provided     Deficiency of other specified B group vitamins (CODE)     4/15/2015     Hearing loss      No Comments Provided     Legal blindness      Type II diabetes mellitus with complication (H)     9/23/2015      Past Surgical History:   Procedure Laterality Date     COLONOSCOPY      No Comments Provided     EXTRACAPSULAR CATARACT EXTRATION WITH INTRAOCULAR LENS IMPLANT      1997 and 2001     HYSTERECTOMY TOTAL ABDOMINAL, BILATERAL SALPINGO-OOPHORECTOMY, COMBINED      age 55, fibroids     OTHER SURGICAL HISTORY      20440.0,CT INJ ANTER CHMBR EYE MEDICATN,Left,for macular degeneration     RELEASE CARPAL TUNNEL      No Comments Provided     TONSILLECTOMY      age 5     Family History   Problem Relation Age of Onset     Breast Cancer Mother      Cancer-breast, breast cancer     Other - See Comments Mother      Stroke,stroke or sudden death     Parkinsonism Daughter       Parkinson's     Other - See Comments Brother       Parkinson's     Other - See Comments Brother       kidney failure     Cerebrovascular Disease Sister      Stroke,stroke     Ovarian Cancer Daughter      ovarian cancer       Review of Systems history of B12 deficiency    PHQ-2 Score:     PHQ-2 ( 1999 Pfizer) 10/16/2018 4/23/2018   Q1: Little interest or pleasure in doing things 0 0   Q2: Feeling down, depressed or hopeless 0 0   PHQ-2 Score 0 0         PHQ-9 SCORE 8/29/2016   Total Score 2         OBJECTIVE:     /72 (BP Location: Right arm, Patient Position: Sitting, Cuff Size: Adult Regular)  Pulse 76  Resp 18  Wt 120 lb (54.4 kg)  BMI 23.44 kg/m2  Body mass index is 23.44 kg/(m^2).  Physical Exam   Constitutional: She appears well-developed and well-nourished.   HENT:   Patient is moderately hard of hearing   Cardiovascular: Normal rate and regular rhythm.    Pulmonary/Chest: She has no wheezes. She has no rales.   Psychiatric: She has a normal mood and affect.   Nursing note and vitals reviewed.         Results for orders placed or performed in visit on 10/16/18   Hemoglobin A1c   Result Value Ref Range    Hemoglobin A1C 7.8 (H) 4.0 -  6.0 %   Vitamin B12   Result Value Ref Range    Vitamin B12 834 180 - 914 pg/mL         ASSESSMENT/PLAN:       ICD-10-CM    1. Type 2 diabetes mellitus with diabetic neuropathy, without long-term current use of insulin (H) E11.40 Hemoglobin A1c     DIABETES EDUCATOR REFERRAL     Hemoglobin A1c   2. B12 deficiency E53.8 Vitamin B12     Vitamin B12   3. Macular degeneration (senile) of retina H35.30 DIABETES EDUCATOR REFERRAL   4. Need for prophylactic vaccination and inoculation against influenza Z23 HC FLU VACCINE, INCREASED ANTIGEN, PRESV FREE            PLAN:  1.  I have personally reviewed the labs listed above.  Goal is to keep A1c under 8 to decrease hyperglycemia affects.  I would also recommend keeping her A1c above 7 to limit risk of hypoglycemia.  2.  Continue with B12 replacement.  3.  Referral to diabetes education as patient would like to continue to be able to check her blood sugars and is wondering about other equipment available due to her low vision.  4.  Flu vaccine updated today    Follow-up in 6 months      GABINO LÓPEZ MD  [facility]    This note was created using voice recognition software and was screened for errors in transcription.

## 2018-10-16 NOTE — PROGRESS NOTES

## 2018-10-16 NOTE — MR AVS SNAPSHOT
After Visit Summary   10/16/2018    Julia Martin    MRN: [id number]           Patient Information     Date Of Birth          1/9/1922        Visit Information        Provider Department      10/16/2018 1:30 PM Della Rhodes MD [facility]        Today's Diagnoses     Type 2 diabetes mellitus with diabetic neuropathy, without long-term current use of insulin (H)    -  1    B12 deficiency        Macular degeneration (senile) of retina        Need for prophylactic vaccination and inoculation against influenza           Follow-ups after your visit        Additional Services     DIABETES EDUCATOR REFERRAL       DIABETES SELF MANAGEMENT TRAINING (DSMT)      Your provider has referred you to Diabetes Education: PREFERRED PROVIDERS:    A  will call you to make your appointment. If it has been more than 3 business days since your referral was placed, please call the above phone number to schedule.    Type of training and number of hours: follow up     Diabetes Type: Type 2 - On Oral Medication   Medicare covers: 10 hours of initial DSMT in 12 month period from the time of first visit, plus 2 hours of follow-up DSMT annually, and additional hours as requested for insulin training.         Diabetes Co-Morbidities: retinopathy               A1C Goal:  <8.0       A1C is: Lab Results       Component                Value               Date                       A1C                      7.5                 04/23/2018              MEDICAL NUTRITION THERAPY (MNT) for Diabetes    Medical Nutrition Therapy with a Registered Dietitian can be provided in coordination with Diabetes Self-Management Training to assist in achieving optimal diabetes management.    MNT Type and Hours: Previous diagnosis: Annual follow-up MNT - 2 hours                       Medicare will cover: 3 hours initial MNT in 12 month period after first visit, plus 2 hours of follow-up MNT annually     "    Diabetes Education Topics: Blood glucose meter instruction     Special Educational Needs Requiring Individual DSMT: Other: blindness      Please be aware that coverage of these services is subject to the terms and limitations of your health insurance plan.  Call member services at your health plan to determine Diabetes Self-Management Training (Codes  and ) and Medical Nutrition Therapy (Codes 06565 and 42488) benefits and ask which blood glucose monitor brands are covered by your plan.  Please bring the following with you to your appointment:    (1)  List of current medications   (2)  List of Blood Glucose Monitor brands that are covered by your insurance plan  (3)  Blood Glucose Monitor and log book  (4)   Food records for the 3 days prior to your visit    The Certified Diabetes Educator may make diabetes medication adjustments per the CDE Protocol and Collaborative Practice Agreement.                  Who to contact     If you have questions or need follow up information about today's clinic visit or your schedule please contact [facility] AND [facility] directly at [phone].  Normal or non-critical lab and imaging results will be communicated to you by [facility], letter or phone within 4 business days after the clinic has received the results. If you do not hear from us within 7 days, please contact the clinic through [email or web address].met or phone. If you have a critical or abnormal lab result, we will notify you by phone as soon as possible.  Submit refill requests through [facility] or call your pharmacy and they will forward the refill request to us. Please allow 3 business days for your refill to be completed.          Additional Information About Your Visit        [facility] Information     [facility] lets you send messages to your doctor, view your test results, renew your prescriptions, schedule appointments and more. To sign up, go to [email or web address] . Click on \"Log in\" on the left side of the " "screen, which will take you to the Welcome page. Then click on \"Sign up Now\" on the right side of the page.     You will be asked to enter the access code listed below, as well as some personal information. Please follow the directions to create your username and password.     Your access code is: [email or web address]  Expires: 2019 11:02 PM     Your access code will  in 90 days. If you need help or a new code, please call your [facility] or [phone].        Care EveryWhere ID     This is your Care EveryWhere ID. This could be used by other organizations to access your [place] medical records  [id number]        Your Vitals Were     Pulse Respirations BMI (Body Mass Index)             76 18 23.44 kg/m2          Blood Pressure from Last 3 Encounters:   10/16/18 136/72   10/09/18 107/64   18 114/64    Weight from Last 3 Encounters:   10/16/18 120 lb (54.4 kg)   10/09/18 123 lb (55.8 kg)   18 122 lb 9.6 oz (55.6 kg)              We Performed the Following     DIABETES EDUCATOR REFERRAL     HC FLU VACCINE, INCREASED ANTIGEN, PRESV FREE     Hemoglobin A1c     Vitamin B12        Primary Care Provider Office Phone # Fax #    Della GAXIOLA Anibal Santos [phone] [address] [facility] [address]        Equal Access to Services     [facility]: Hadii aad ku hadasho [place], waaxda luqadaha, qaybta kaalmada francisco javier, monica faria . So [facility] [phone].    ATENCIÓN: Si habla español, tiene a temple disposición servicios gratuitos de asistencia lingüística. Llame al [phone].    We comply with applicable federal civil rights laws and [place] laws. We do not discriminate on the basis of race, color, national origin, age, disability, sex, sexual orientation, or gender identity.            Thank you!     Thank you for choosing [facility] AND [facility]  for your care. Our goal is always to provide you with excellent care. Hearing " back from our patients is one way we can continue to improve our services. Please take a few minutes to complete the written survey that you may receive in the mail after your visit with us. Thank you!             Your Updated Medication List - Protect others around you: Learn how to safely use, store and throw away your medicines at www.disposemymeds.org.          This list is accurate as of 10/16/18  6:00 PM.  Always use your most recent med list.                   Brand Name Dispense Instructions for use Diagnosis    amoxicillin-clavulanate 500-125 MG per tablet    AUGMENTIN    20 tablet    Take 1 tablet by mouth 2 times daily        aspirin 325 MG EC tablet      Take 325 mg by mouth daily with food        GLENIS CONTOUR test strip   Generic drug:  blood glucose monitoring      Test blood sugars once per day Up to 2 times daily if necessary.  Dx: E11.9        * blood glucose monitoring lancets      Use to test blood sugars one to two times daily as directed.  Dx: E11.9        * blood glucose monitoring lancets     102 each    Use to test blood sugars one to two times daily as directed. Dx: E11.9    Type 2 diabetes mellitus with complication, without long-term current use of insulin (H)       glipiZIDE 5 MG tablet    GLUCOTROL    90 tablet    TAKE 2 TABLETS BY MOUTH EVERY DAY WITH BREAKFAST, THEN TAKE 1 TABLETAT SUPPER TIME    Type 2 diabetes mellitus with complication, without long-term current use of insulin (H)       PRESERVISION AREDS Caps           RA VITAMIN B-12 TR 1000 MCG Tbcr   Generic drug:  cyanocobalamin      Take 1,000 mcg by mouth daily        roller walker Misc      Rolling Walker for home use. With seat.        simvastatin 10 MG tablet    ZOCOR    90 tablet    Take 1 tablet (10 mg) by mouth At Bedtime    Type 2 diabetes mellitus with complication, without long-term current use of insulin (H)       sitagliptin 50 MG tablet    JANUVIA    90 tablet    Take 1 tablet (50 mg) by mouth daily Take 50 mg by  mouth daily    Type 2 diabetes mellitus with complication, without long-term current use of insulin (H)       vitamin D3 1000 units Caps      Take 1,000 Units by mouth daily        * Notice:  This list has 2 medication(s) that are the same as other medications prescribed for you. Read the directions carefully, and ask your doctor or other care provider to review them with you.

## 2018-10-16 NOTE — NURSING NOTE
Chief Complaint   Patient presents with     Diabetes     diabetic check       Initial /72 (BP Location: Right arm, Patient Position: Sitting, Cuff Size: Adult Regular)  Pulse 76  Resp 18  Wt 120 lb (54.4 kg)  BMI 23.44 kg/m2 Estimated body mass index is 23.44 kg/(m^2) as calculated from the following:    Height as of 10/9/18: 5' (1.524 m).    Weight as of this encounter: 120 lb (54.4 kg).  Medication Reconciliation: complete   Previous A1C is at goal of <8  Lab Results   Component Value Date    A1C 7.5 04/23/2018     Urine microalbumin:creatine: na  Foot exam  unknown  Eye exam 03/15/18    Tobacco User no  Patient is on a daily aspirin  Patient is on a Statin.  Blood pressure today of:     BP Readings from Last 1 Encounters:   10/16/18 136/72      is at the goal of <139/89 for diabetics.    Mary Beth Crump LPN on 10/16/2018 at 1:59 PM

## 2018-10-16 NOTE — LETTER
October 16, 2018      Julia Martin  [address]   [place] [address]        Dear Julia,     Here is a copy of your recent labs:    Results for orders placed or performed in visit on 10/16/18   Hemoglobin A1c   Result Value Ref Range    Hemoglobin A1C 7.8 (H) 4.0 - 6.0 %   Vitamin B12   Result Value Ref Range    Vitamin B12 834 180 - 914 pg/mL         These results are both normal.  I would not recommend a change in your diabetes medication at this time.        Sincerely,        GABINO LÓPEZ MD

## 2018-11-06 ENCOUNTER — ALLIED HEALTH/NURSE VISIT (OUTPATIENT)
Dept: EDUCATION SERVICES | Facility: OTHER | Age: 83
End: 2018-11-06
Attending: FAMILY MEDICINE
Payer: MEDICARE

## 2018-11-06 DIAGNOSIS — E11.8 TYPE 2 DIABETES MELLITUS WITH COMPLICATION, WITHOUT LONG-TERM CURRENT USE OF INSULIN (H): Primary | ICD-10-CM

## 2018-11-06 PROCEDURE — G0108 DIAB MANAGE TRN  PER INDIV: HCPCS

## 2018-11-06 RX ORDER — BLOOD-GLUCOSE METER
KIT MISCELLANEOUS
Qty: 1 KIT | Refills: 0 | Status: SHIPPED | OUTPATIENT
Start: 2018-11-06

## 2018-11-06 NOTE — MR AVS SNAPSHOT
After Visit Summary   11/6/2018    Julia Martin    MRN: [id number]           Patient Information     Date Of Birth          1/9/1922        Visit Information        Provider Department      11/6/2018 2:00 PM  DIABETES EDUCATION [facility]        Today's Diagnoses     Type 2 diabetes mellitus with complication, without long-term current use of insulin (H)    -  1      Care Instructions    Diabetes Goals and Reminders    Your A1c test should be done every 3 months.  Your goal is less than 8%.   Your last result is:  Lab Results   Component Value Date    A1C 7.8 10/16/2018       Your LDL cholesterol test should be done at least once a year.  Take a statin, if prescribed by your doctor, based on age and risk factors.  Your last result is:  LDL Cholesterol Calculated   Date Value Ref Range Status   01/03/2017 82 <100 mg/dL        Have blood pressure and weight checked every three months.  Your blood pressure goal is 140/90 or less.  Your last blood pressure reading was:   BP Readings from Last 1 Encounters:   10/16/18 136/72       Test your blood sugar 1 - 2 times per day.  Your home blood glucose target ranges are:  Fasting or Before meals:   2 hours after a meal: Less than 200      Avoid all tobacco.  Follow your healthy diet and exercise plan.  See the eye doctor every year.  See the dentist every six months.  Have kidney function tested yearly.    Take all medicine as prescribed.  Please let me know if you are having symptoms you don t expect or if you wish to stop any medicine.    Follow Up Plan  Please call or visit Diabetes Education if blood sugars are consistently out of target.  Your future lab plan is:  HgA1c in January 2019.    If you need your cholesterol checked at your next appointment, you should fast 8 to 10 hours before your appointment.  Do not eat or drink anything besides water.  Drink plenty of water and take all your usual medicine.    SUMMARY FOR  "TODAY'S VISIT    1.  Discussed using a talking BG meter for testing blood sugar daily.    2.  I will send your meter request to [facility].    3.  Follow up for continued diabetes education, as needed.      Remedios Simon RN, BSN, CDE  2018 3:05 PM                Follow-ups after your visit        Who to contact     If you have questions or need follow up information about today's clinic visit or your schedule please contact [facility] directly at [phone].  Normal or non-critical lab and imaging results will be communicated to you by [facility]art, letter or phone within 4 business days after the clinic has received the results. If you do not hear from us within 7 days, please contact the clinic through Ongot or phone. If you have a critical or abnormal lab result, we will notify you by phone as soon as possible.  Submit refill requests through [facility]a or call your pharmacy and they will forward the refill request to us. Please allow 3 business days for your refill to be completed.          Additional Information About Your Visit        Hara Information     [facility] lets you send messages to your doctor, view your test results, renew your prescriptions, schedule appointments and more. To sign up, go to [email or web address] . Click on \"Log in\" on the left side of the screen, which will take you to the Welcome page. Then click on \"Sign up Now\" on the right side of the page.     You will be asked to enter the access code listed below, as well as some personal information. Please follow the directions to create your username and password.     Your access code is: [email or web address]  Expires: 2019 10:02 PM     Your access code will  in 90 days. If you need help or a new code, please call your [place] clinic or [phone].        Care EveryWhere ID     This is your Care EveryWhere ID. This could be used by other organizations to access your [place] medical " records  [id number]         Blood Pressure from Last 3 Encounters:   10/16/18 136/72   10/09/18 107/64   04/23/18 114/64    Weight from Last 3 Encounters:   10/16/18 54.4 kg (120 lb)   10/09/18 55.8 kg (123 lb)   04/23/18 55.6 kg (122 lb 9.6 oz)              Today, you had the following     No orders found for display       Primary Care Provider Office Phone # Fax #    Della GAXIOLA Anibal Santos [phone]       [address] [place] [address]        Goals        General    Healthy Coping     Healthy Eating (pt-stated)     Problem Solving (pt-stated)     Reducing Risks (pt-stated)       Equal Access to Services     HANSEL CLAIRE : Hadii aad ku hadasho Sok[place], waaxda luqadaha, qaybta kaalmada adeegyada, monica jarvis. So [facility] [phone].    ATENCIÓN: Si habla español, tiene a temple disposición servicios gratuitos de asistencia lingüística. Llame al [phone].    We comply with applicable federal civil rights laws and [place] laws. We do not discriminate on the basis of race, color, national origin, age, disability, sex, sexual orientation, or gender identity.            Thank you!     Thank you for choosing [facility] AND [facility]  for your care. Our goal is always to provide you with excellent care. Hearing back from our patients is one way we can continue to improve our services. Please take a few minutes to complete the written survey that you may receive in the mail after your visit with us. Thank you!             Your Updated Medication List - Protect others around you: Learn how to safely use, store and throw away your medicines at www.disposemymeds.org.          This list is accurate as of 11/6/18  3:08 PM.  Always use your most recent med list.                   Brand Name Dispense Instructions for use Diagnosis    amoxicillin-clavulanate 500-125 MG per tablet    AUGMENTIN    20 tablet    Take 1 tablet by mouth 2 times daily         aspirin 325 MG EC tablet      Take 325 mg by mouth daily with food        GLENIS CONTOUR test strip   Generic drug:  blood glucose monitoring      Test blood sugars once per day Up to 2 times daily if necessary.  Dx: E11.9        * blood glucose monitoring lancets      Use to test blood sugars one to two times daily as directed.  Dx: E11.9        * blood glucose monitoring lancets     102 each    Use to test blood sugars one to two times daily as directed. Dx: E11.9    Type 2 diabetes mellitus with complication, without long-term current use of insulin (H)       glipiZIDE 5 MG tablet    GLUCOTROL    90 tablet    TAKE 2 TABLETS BY MOUTH EVERY DAY WITH BREAKFAST, THEN TAKE 1 TABLETAT SUPPER TIME    Type 2 diabetes mellitus with complication, without long-term current use of insulin (H)       PRESERVISION AREDS Caps           RA VITAMIN B-12 TR 1000 MCG Tbcr   Generic drug:  cyanocobalamin      Take 1,000 mcg by mouth daily        roller walker Misc      Rolling Walker for home use. With seat.        simvastatin 10 MG tablet    ZOCOR    90 tablet    Take 1 tablet (10 mg) by mouth At Bedtime    Type 2 diabetes mellitus with complication, without long-term current use of insulin (H)       sitagliptin 50 MG tablet    JANUVIA    90 tablet    Take 1 tablet (50 mg) by mouth daily Take 50 mg by mouth daily    Type 2 diabetes mellitus with complication, without long-term current use of insulin (H)       vitamin D3 1000 units Caps      Take 1,000 Units by mouth daily        * Notice:  This list has 2 medication(s) that are the same as other medications prescribed for you. Read the directions carefully, and ask your doctor or other care provider to review them with you.

## 2018-11-06 NOTE — PATIENT INSTRUCTIONS
Diabetes Goals and Reminders    Your A1c test should be done every 3 months.  Your goal is less than 8%.   Your last result is:  Lab Results   Component Value Date    A1C 7.8 10/16/2018       Your LDL cholesterol test should be done at least once a year.  Take a statin, if prescribed by your doctor, based on age and risk factors.  Your last result is:  LDL Cholesterol Calculated   Date Value Ref Range Status   01/03/2017 82 <100 mg/dL        Have blood pressure and weight checked every three months.  Your blood pressure goal is 140/90 or less.  Your last blood pressure reading was:   BP Readings from Last 1 Encounters:   10/16/18 136/72       Test your blood sugar 1 - 2 times per day.  Your home blood glucose target ranges are:  Fasting or Before meals:   2 hours after a meal: Less than 200      Avoid all tobacco.  Follow your healthy diet and exercise plan.  See the eye doctor every year.  See the dentist every six months.  Have kidney function tested yearly.    Take all medicine as prescribed.  Please let me know if you are having symptoms you don t expect or if you wish to stop any medicine.    Follow Up Plan  Please call or visit Diabetes Education if blood sugars are consistently out of target.  Your future lab plan is:  HgA1c in January 2019.    If you need your cholesterol checked at your next appointment, you should fast 8 to 10 hours before your appointment.  Do not eat or drink anything besides water.  Drink plenty of water and take all your usual medicine.    SUMMARY FOR TODAY'S VISIT    1.  Discussed using a talking BG meter for testing blood sugar daily.    2.  I will send your meter request to [facility].    3.  Follow up for continued diabetes education, as needed.      Remedios Simon RN, BSN, CDE  11/6/2018 3:05 PM

## 2018-11-06 NOTE — PROGRESS NOTES
Diabetes Self-Management Education & Support    Diabetes Education Self Management & Training    SUBJECTIVE/OBJECTIVE:  Presents for: Individual review  Accompanied by: Daughter  Diabetes education in the past 24mo: No  Focus of Visit: Monitoring  Diabetes type: Type 2  Date of diagnosis: 8/2006  Disease course: Getting harder to manage  How confident are you filling out medical forms by yourself:: Not at all (I can't read due to my poor eye sight.)  Transportation concerns: No  Other concerns:: Visual impairment  Cultural Influences/Ethnic Background:  American    Patient has difficulty SMBG with current Keven BG meter due to her eyesight.  She is interested in learning about meter options to make SMBG easier with limited vision.    Diabetes Symptoms & Complications  Fatigue: No (Sometimes)  Neuropathy: Yes  Foot ulcerations: No  Polydipsia: No  Polyphagia: No  Polyuria: Yes  Visual change: Yes  Weakness: No  Weight loss: Yes  Slow healing wounds: No  Recent Infection(s): Yes (Pneumonia, but has resolved.)  Symptom course: Stable  Weight trend: Fluctuating minimally  Autonomic neuropathy: No  CVA: No (Only a TIA)  Heart disease: No  Nephropathy: No  Peripheral neuropathy: Yes  Peripheral Vascular Disease: No  Retinopathy: No    Patient Problem List and Family Medical History reviewed for relevant medical history, current medical status, and diabetes risk factors.    Vitals:  There were no vitals taken for this visit.  Estimated body mass index is 23.44 kg/(m^2) as calculated from the following:    Height as of 10/9/18: 1.524 m (5').    Weight as of 10/16/18: 54.4 kg (120 lb).   Last 3 BP:   BP Readings from Last 3 Encounters:   10/16/18 136/72   10/09/18 107/64   04/23/18 114/64       History   Smoking Status     Former Smoker   Smokeless Tobacco     Never Used       Labs:  Lab Results   Component Value Date    A1C 7.8 10/16/2018     Lab Results   Component Value Date     10/09/2018     Lab Results    Component Value Date    LDL 82 01/03/2017     HDL Cholesterol   Date Value Ref Range Status   01/03/2017 52 23 - 92 mg/dL    ]  GFR Estimate   Date Value Ref Range Status   10/09/2018 63 >60 mL/min/1.7m2 Final     GFR Estimate If Black   Date Value Ref Range Status   10/09/2018 76 >60 mL/min/1.7m2 Final     Lab Results   Component Value Date    CR 0.84 10/09/2018     No results found for: MICROALBUMIN    Healthy Eating  Healthy Eating Assessed Today: Yes  Patient on a regular basis: Eats 3 meals a day  Meal planning: Low salt, Smaller portions  Meals include: Breakfast, Lunch, Dinner (Big breakfast, small lunch and very small dinner.)  Beverages: Water, Tea, Juice  Has patient met with a dietitian in the past?: Yes    Being Active  Being Active Assessed Today: Yes  Exercise:: Yes (Uses a new-step bike and use my arms too.  )  Days per week of moderate to strenuous exercise (like a brisk walk): 5  On average, minutes per day of exercise at this level: 20 (I usually get 1200 - 1400 steps per day.)  How intense was your typical exercise? :  (In between light and moderate.)  Exercise Minutes per Week: 100  Barrier to exercise: Other (I cannot see well.)    Monitoring  Reviewed BG testing and tips given to help test in low light, low vision situations.  Patient used her Fastclix lancet device and checked her  mg/dL.  She is interested in a talking BG meter, such as Prodigy Voice.      BG meter and test strips sent eRx to [facility].  Patient SMBG 1 - 2 times weekly, but would like to SMBG 1 - 2 times daily.  Extra test strip recommended due to difficulty getting the blood to the test strip and causing errors.      Encouraged patient to practice SMBG and using the Prodigy Voice meter can help decrease risk of errors such as applying the blood too early.        Taking Medications  Diabetes Medication(s)     Dipeptidyl Peptidase-4 (DPP-4) Inhibitors Sig    sitagliptin (JANUVIA) 50 MG tablet Take 1 tablet  (50 mg) by mouth daily Take 50 mg by mouth daily    Sulfonylureas Sig    glipiZIDE (GLUCOTROL) 5 MG tablet TAKE 2 TABLETS BY MOUTH EVERY DAY WITH BREAKFAST, THEN TAKE 1 TABLETAT SUPPER TIME          Taking Medication Assessed Today: Yes  Current Treatments: Diet, Oral Agent (dual therapy)  Problems taking diabetes medications regularly?: No  Diabetes medication side effects?: No  Treatment Compliance: All of the time    Problem Solving  Problem Solving Assessed Today: Yes  Hypoglycemia Frequency: Rarely  Hypoglycemia Treatment: Candy  Patient carries a carbohydrate source: No    Hypoglycemia symptoms  Confusion: No  Dizziness or Light-Headedness: No  Headaches: No  Hunger: No  Mood changes: No  Nervousness/Anxiety: No  Sleepiness: No  Speech difficulty: No  Sweats: No  Tremors: Yes    Hypoglycemia Complications  Blackouts: No  Hospitalization: No  Nocturnal hypoglycemia: No  Required assistance: No  Required glucagon injection: No  Seizures: No    Reducing Risks  Reducing Risks Assessed Today: Yes  Diabetes Risks: Age over 45 years  CAD Risks: Diabetes Mellitus  Has dilated eye exam at least once a year?: Yes  Sees dentist every 6 months?: Yes  Sees podiatrist (foot doctor)?: Yes    Healthy Coping  Healthy Coping Assessed Today: Yes  Emotional response to diabetes: Acceptance, Uncertain  Informal Support system:: Children, Friends  Stage of change: ACTION (Actively working towards change)  Difficulty affording diabetes management supplies?: No  Support resources: Offerings in Clinic Communities ([place]  Patient Activation Measure Survey Score:  No flowsheet data found.    ASSESSMENT:  BG meter training given and patient return demonstration with some difficulty getting enough blood onto the test strip.  Patient will use deeper setting to get a larger drop, making it easier to apply enough blood to the test strip.  Talking meter will help her to know if she has applied enough blood to the test  strip.    Patient questions Continuous Glucose Monitor using a reader for BG results, but unfortunately, patient does not qualify for Medicare coverage of the CGM at this time.  At this time Medicare requires patients to SMBG a minimum of 4 x daily and take at least 3 insulin shots daily, using a sliding scale for insulin adjustments.     Goals Addressed as of 11/6/2018 at 5:38 PM             Today       Patient Stated      Healthy Eating (pt-stated)   70%    Added 11/6/18 by Remedios Simon RN      Problem Solving (pt-stated)   60%    Added 11/6/18 by Remedios Simon RN      Reducing Risks (pt-stated)   70%    Added 11/6/18 by Remedios Simon RN       Other      Healthy Coping   On track    Added 11/6/18 by Remedios Simon RN          Patient's most recent   Lab Results   Component Value Date    A1C 7.8 10/16/2018    is meeting goal of <8.0    INTERVENTION:   Diabetes knowledge and skills assessment:     Patient is knowledgeable in diabetes management concepts related to: Being Active, Taking Medication and Healthy Coping    Patient needs further education on the following diabetes management concepts: Healthy Eating, Monitoring, Problem Solving and Reducing Risks    Based on learning assessment above, most appropriate setting for further diabetes education would be: Individual setting.    Education provided today on:  AADE Self-Care Behaviors:  Monitoring: proper technique, individual blood glucose targets and frequency of monitoring  Problem Solving: high blood glucose - causes, signs/symptoms, treatment and prevention, low blood glucose - causes, signs/symptoms, treatment and prevention and carrying a carbohydrate source at all times  Reducing Risks: major complications of diabetes, prevention, early diagnostic measures and treatment of complications and A1C - goals, relating to blood glucose levels, how often to check    Opportunities for ongoing education and support in diabetes-self management were  discussed.    Pt verbalized understanding of concepts discussed and recommendations provided today.       Education Materials Provided:  Diabetes Success Plan    PLAN:  See Patient Instructions for co-developed, patient-stated behavior change goals.  AVS printed and provided to patient today. See Follow-Up section for recommended follow-up.    Remedios Simon RN, BSN, CDE  11/6/2018 5:50 PM   Time Spent: 60 minutes  Encounter Type: Individual    Any diabetes medication dose changes were made via the CDE Protocol and Collaborative Practice Agreement with the patient's primary care provider. A copy of this encounter was shared with the provider.

## 2018-11-15 ENCOUNTER — TELEPHONE (OUTPATIENT)
Dept: FAMILY MEDICINE | Facility: OTHER | Age: 83
End: 2018-11-15

## 2018-11-15 NOTE — TELEPHONE ENCOUNTER
Patient's daughter called and said that her right foot 2 nd digit nail bed is at a 90 degree angle upward. It is also red and inflamed . Daughter says she is at [facility] and does see a podiatrist Tuesday . She just wondered if she needed a antibiotic or what to do for this. Is aware doctor out today . Please advise. Loren Momin LPN ....................11/15/2018  4:15 PM

## 2018-11-16 ENCOUNTER — OFFICE VISIT (OUTPATIENT)
Dept: FAMILY MEDICINE | Facility: OTHER | Age: 83
End: 2018-11-16
Attending: FAMILY MEDICINE
Payer: MEDICARE

## 2018-11-16 VITALS
WEIGHT: 122.25 LBS | BODY MASS INDEX: 23.88 KG/M2 | TEMPERATURE: 99.3 F | SYSTOLIC BLOOD PRESSURE: 136 MMHG | HEART RATE: 92 BPM | DIASTOLIC BLOOD PRESSURE: 76 MMHG

## 2018-11-16 DIAGNOSIS — E11.8 TYPE 2 DIABETES MELLITUS WITH COMPLICATION, WITHOUT LONG-TERM CURRENT USE OF INSULIN (H): ICD-10-CM

## 2018-11-16 DIAGNOSIS — S91.209A AVULSION OF TOENAIL, INITIAL ENCOUNTER: Primary | ICD-10-CM

## 2018-11-16 DIAGNOSIS — H61.21 IMPACTED CERUMEN OF RIGHT EAR: ICD-10-CM

## 2018-11-16 PROCEDURE — 69209 REMOVE IMPACTED EAR WAX UNI: CPT | Performed by: FAMILY MEDICINE

## 2018-11-16 PROCEDURE — 99213 OFFICE O/P EST LOW 20 MIN: CPT | Performed by: FAMILY MEDICINE

## 2018-11-16 PROCEDURE — G0463 HOSPITAL OUTPT CLINIC VISIT: HCPCS | Mod: 25

## 2018-11-16 ASSESSMENT — PAIN SCALES - GENERAL: PAINLEVEL: NO PAIN (0)

## 2018-11-16 ASSESSMENT — ENCOUNTER SYMPTOMS: CONSTITUTIONAL NEGATIVE: 1

## 2018-11-16 NOTE — NURSING NOTE
Chief Complaint   Patient presents with     toe problem       Initial /76 (BP Location: Right arm, Patient Position: Sitting, Cuff Size: Adult Regular)  Pulse 92  Temp 99.3  F (37.4  C) (Tympanic)  Wt 122 lb 4 oz (55.5 kg)  BMI 23.88 kg/m2 Estimated body mass index is 23.88 kg/(m^2) as calculated from the following:    Height as of 10/9/18: 5' (1.524 m).    Weight as of this encounter: 122 lb 4 oz (55.5 kg).  Medication Reconciliation: complete    Jayde Carpenter LPN     Patient presents today as her toenail on her second toe on left foot has fallen off and is red.      The canal was irrigated with body-temperature tap water with the jet of water directed superiorly.  The ear canal was then re-examined and cleared of the impaction.  The patient tolerated the procedure well.  Jayde Carpenter LPN  11/16/2018  2:26 PM

## 2018-11-16 NOTE — TELEPHONE ENCOUNTER
Trisha notified of below, they can come today. Appointment offered for 145, she accepted.    Mary Ramos LPN.................. 11/16/2018 8:03 AM

## 2018-11-16 NOTE — MR AVS SNAPSHOT
After Visit Summary   11/16/2018    Julia Martin    MRN: [id number]           Patient Information     Date Of Birth          1/9/1922        Visit Information        Provider Department      11/16/2018 1:45 PM Della Rhodes MD [facility]        Today's Diagnoses     Avulsion of toenail, initial encounter    -  1    Type 2 diabetes mellitus with complication, without long-term current use of insulin (H)        Impacted cerumen of right ear           Follow-ups after your visit        Who to contact     If you have questions or need follow up information about today's clinic visit or your schedule please contact [facility] directly at [phone].  Normal or non-critical lab and imaging results will be communicated to you by MyChart, letter or phone within 4 business days after the clinic has received the results. If you do not hear from us within 7 days, please contact the clinic through MyChart or phone. If you have a critical or abnormal lab result, we will notify you by phone as soon as possible.  Submit refill requests through [employer] or call your pharmacy and they will forward the refill request to us. Please allow 3 business days for your refill to be completed.          Additional Information About Your Visit        Care EveryWhere ID     This is your Care EveryWhere ID. This could be used by other organizations to access your [facility] medical records  [id number]        Your Vitals Were     Pulse Temperature BMI (Body Mass Index)             92 99.3  F (37.4  C) (Tympanic) 23.88 kg/m2          Blood Pressure from Last 3 Encounters:   11/16/18 136/76   10/16/18 136/72   10/09/18 107/64    Weight from Last 3 Encounters:   11/16/18 122 lb 4 oz (55.5 kg)   10/16/18 120 lb (54.4 kg)   10/09/18 123 lb (55.8 kg)              Today, you had the following     No orders found for display       Primary Care Provider Office Phone # Fax #     Della Santos [phone]       [address] [facility] [address]        Goals        General    Healthy Coping     Healthy Eating (pt-stated)     Problem Solving (pt-stated)     Reducing Risks (pt-stated)       Equal Access to Services     TRANGMARY DAKOTAH : Hadii aad ku hadasho Soomaali, waaxda luqadaha, qaybta kaalmada adeegyada, waxyesika ibarra yadijelena estrada rosaliawesly jarvis. So [facility] [phone].    ATENCIÓN: Si habla español, tiene a temple disposición servicios gratuitos de asistencia lingüística. Llame al [phone].    We comply with applicable federal civil rights laws and [place] laws. We do not discriminate on the basis of race, color, national origin, age, disability, sex, sexual orientation, or gender identity.            Thank you!     Thank you for choosing [facility] AND [facility]  for your care. Our goal is always to provide you with excellent care. Hearing back from our patients is one way we can continue to improve our services. Please take a few minutes to complete the written survey that you may receive in the mail after your visit with us. Thank you!             Your Updated Medication List - Protect others around you: Learn how to safely use, store and throw away your medicines at www.disposemymeds.org.          This list is accurate as of 11/16/18  5:12 PM.  Always use your most recent med list.                   Brand Name Dispense Instructions for use Diagnosis    aspirin 325 MG EC tablet      Take 325 mg by mouth daily with food        * GLENIS CONTOUR test strip   Generic drug:  blood glucose monitoring      Test blood sugars once per day Up to 2 times daily if necessary.  Dx: E11.9        * blood glucose monitoring test strip    PRODIGY NO CODING BLOOD GLUC    200 strip    Use to test blood sugar 2 times daily or as directed.    Type 2 diabetes mellitus with complication, without long-term current use of insulin (H)       * blood glucose monitoring  lancets      Use to test blood sugars one to two times daily as directed.  Dx: E11.9        * blood glucose monitoring lancets     102 each    Use to test blood sugars one to two times daily as directed. Dx: E11.9    Type 2 diabetes mellitus with complication, without long-term current use of insulin (H)       blood glucose monitoring meter device kit     1 kit    Use to test blood sugar 2 times daily or as directed.    Type 2 diabetes mellitus with complication, without long-term current use of insulin (H)       glipiZIDE 5 MG tablet    GLUCOTROL    90 tablet    TAKE 2 TABLETS BY MOUTH EVERY DAY WITH BREAKFAST, THEN TAKE 1 TABLETAT SUPPER TIME    Type 2 diabetes mellitus with complication, without long-term current use of insulin (H)       PRESERVISION AREDS Caps           RA VITAMIN B-12 TR 1000 MCG ER tablet   Generic drug:  cyanocobalamin      Take 1,000 mcg by mouth daily        roller walker Misc      Rolling Walker for home use. With seat.        simvastatin 10 MG tablet    ZOCOR    90 tablet    Take 1 tablet (10 mg) by mouth At Bedtime    Type 2 diabetes mellitus with complication, without long-term current use of insulin (H)       sitagliptin 50 MG tablet    JANUVIA    90 tablet    Take 1 tablet (50 mg) by mouth daily Take 50 mg by mouth daily    Type 2 diabetes mellitus with complication, without long-term current use of insulin (H)       vitamin D3 1000 units Caps      Take 1,000 Units by mouth daily        * Notice:  This list has 4 medication(s) that are the same as other medications prescribed for you. Read the directions carefully, and ask your doctor or other care provider to review them with you.

## 2018-11-16 NOTE — PROGRESS NOTES
Nursing Notes:   Jayde Carpenter LPN  11/16/2018  2:26 PM  Signed  Chief Complaint   Patient presents with     toe problem       Initial /76 (BP Location: Right arm, Patient Position: Sitting, Cuff Size: Adult Regular)  Pulse 92  Temp 99.3  F (37.4  C) (Tympanic)  Wt 122 lb 4 oz (55.5 kg)  BMI 23.88 kg/m2 Estimated body mass index is 23.88 kg/(m^2) as calculated from the following:    Height as of 10/9/18: 5' (1.524 m).    Weight as of this encounter: 122 lb 4 oz (55.5 kg).  Medication Reconciliation: complete    Jayde Carpenter LPN     Patient presents today as her toenail on her second toe on left foot has fallen off and is red.      The canal was irrigated with body-temperature tap water with the jet of water directed superiorly.  The ear canal was then re-examined and cleared of the impaction.  The patient tolerated the procedure well.  Jayde Carpenter LPN  11/16/2018  2:26 PM               SUBJECTIVE:   CC:  Julia Martin is a 89 year old female who presents to clinic today for the following health issues:  Toe injury    HPI  Julia Martin is a 89 year old female presents with her daughter for evaluation of left toe toenail injury.  For about a week, her toenail had been partially off, yesterday it came completely off.  Her daughter is worried that it is infected.  My patient cannot see well enough to really  her toe itself.  She has diabetic neuropathy, poor sensation in her feet.  She did apply some antibiotic ointment to her toe last night.    She typically is seen by a podiatrist at her assisted living every other month.  However, missed her most recent appointment time.   No Known Allergies  Current Outpatient Prescriptions   Medication     aspirin  MG EC tablet     blood glucose monitoring (ACCU-CHEK FASTCLIX) lancets     blood glucose monitoring (GLENIS CONTOUR) test strip     blood glucose monitoring (GLENIS MICROLET) lancets     blood glucose monitoring (PRODIGY NO CODING BLOOD  GLUC) test strip     blood glucose monitoring (BookBag GLUCOSE MONITOR) meter device kit     Cholecalciferol (VITAMIN D3) 1000 UNITS CAPS     cyanocobalamin (RA VITAMIN B-12 TR) 1000 MCG TBCR     glipiZIDE (GLUCOTROL) 5 MG tablet     Misc. Devices (ROLLER WALKER) MISC     Multiple Vitamins-Minerals (PRESERVISION AREDS) CAPS     simvastatin (ZOCOR) 10 MG tablet     sitagliptin (JANUVIA) 50 MG tablet     No current facility-administered medications for this visit.       Past Medical History:   Diagnosis Date     Actinic keratosis     No Comments Provided     Deficiency of other specified B group vitamins (CODE)     4/15/2015     Hearing loss     No Comments Provided     Legal blindness      Type II diabetes mellitus with complication (H)     9/23/2015      Past Surgical History:   Procedure Laterality Date     COLONOSCOPY      No Comments Provided     EXTRACAPSULAR CATARACT EXTRATION WITH INTRAOCULAR LENS IMPLANT      1997 and 2001     HYSTERECTOMY TOTAL ABDOMINAL, BILATERAL SALPINGO-OOPHORECTOMY, COMBINED      age 55, fibroids     OTHER SURGICAL HISTORY      92868.0,OH INJ ANTER CHMBR EYE MEDICATN,Left,for macular degeneration     RELEASE CARPAL TUNNEL      No Comments Provided     TONSILLECTOMY      age 5       Review of Systems   Constitutional: Negative.    HENT:        Has hearing aids, feels that either her right hearing aid is not working or that ear is plugged   Eyes: Positive for visual disturbance.        PHQ-2 Score:     PHQ-2 ( 1999 Pfizer) 11/16/2018 10/16/2018   Q1: Little interest or pleasure in doing things 0 0   Q2: Feeling down, depressed or hopeless 0 0   PHQ-2 Score 0 0         PHQ-9 SCORE 8/29/2016   Total Score 2         OBJECTIVE:     /76 (BP Location: Right arm, Patient Position: Sitting, Cuff Size: Adult Regular)  Pulse 92  Temp 99.3  F (37.4  C) (Tympanic)  Wt 122 lb 4 oz (55.5 kg)  BMI 23.88 kg/m2  Body mass index is 23.88 kg/(m^2).   Cerumen in her right ear is  impacted.  Physical Exam   Constitutional: She appears well-developed and well-nourished.   HENT:   Right otic canal is occluded by dark, dried cerumen.  Ear wash is performed by nursing staff with good results.   Musculoskeletal:   Patient is missing her left second toenail.  There is some dried, crusted blood.  This was washed away.  She does have decreased sensation, there is no tenderness, no drainage.   Nursing note and vitals reviewed.       Results for orders placed or performed in visit on 10/16/18   Hemoglobin A1c   Result Value Ref Range    Hemoglobin A1C 7.8 (H) 4.0 - 6.0 %   Vitamin B12   Result Value Ref Range    Vitamin B12 834 180 - 914 pg/mL         ASSESSMENT/PLAN:       ICD-10-CM    1. Avulsion of toenail, initial encounter S91.209A    2. Type 2 diabetes mellitus with complication, without long-term current use of insulin (H) E11.8    3. Impacted cerumen of right ear H61.21             PLAN:  1.  Antibiotic ointment is applied to her toe today.  She should continue this twice daily.  She does have appointment next week with her podiatrist for nail care.  Reviewed signs and symptoms of infection, which may be very difficult for her to assess given her decreased vision and neuropathy.  2.  Ear wash performed today with good results.      GABINO LÓPEZ MD  [facility]    This note was created using voice recognition software and was screened for errors in transcription.

## 2019-01-04 DIAGNOSIS — E11.8 TYPE 2 DIABETES MELLITUS WITH COMPLICATION, WITHOUT LONG-TERM CURRENT USE OF INSULIN (H): ICD-10-CM

## 2019-01-08 RX ORDER — GLIPIZIDE 5 MG/1
TABLET ORAL
Qty: 90 TABLET | Refills: 3 | Status: SHIPPED | OUTPATIENT
Start: 2019-01-08 | End: 2019-04-24

## 2019-01-08 NOTE — TELEPHONE ENCOUNTER
Glipizide   LOV-11/16/2018    Future Office visit:       Routing refill request to provider for review/approval because: Due for annual Lipid and microalbumin      Unable to complete prescription refill per RNMedication Refill Policy.................... Mary Paredes ....................  1/8/2019   8:31 AM

## 2019-02-08 ENCOUNTER — DOCUMENTATION ONLY (OUTPATIENT)
Dept: OTHER | Facility: CLINIC | Age: 84
End: 2019-02-08

## 2019-02-25 ENCOUNTER — TRANSFERRED RECORDS (OUTPATIENT)
Dept: HEALTH INFORMATION MANAGEMENT | Facility: OTHER | Age: 84
End: 2019-02-25

## 2019-03-18 ENCOUNTER — OFFICE VISIT (OUTPATIENT)
Dept: FAMILY MEDICINE | Facility: OTHER | Age: 84
End: 2019-03-18
Attending: FAMILY MEDICINE
Payer: MEDICARE

## 2019-03-18 VITALS
WEIGHT: 122 LBS | HEIGHT: 60 IN | RESPIRATION RATE: 18 BRPM | SYSTOLIC BLOOD PRESSURE: 126 MMHG | HEART RATE: 84 BPM | DIASTOLIC BLOOD PRESSURE: 76 MMHG | BODY MASS INDEX: 23.95 KG/M2

## 2019-03-18 DIAGNOSIS — E53.8 B12 DEFICIENCY: ICD-10-CM

## 2019-03-18 DIAGNOSIS — H35.30 MACULAR DEGENERATION (SENILE) OF RETINA: ICD-10-CM

## 2019-03-18 DIAGNOSIS — E11.40 TYPE 2 DIABETES MELLITUS WITH DIABETIC NEUROPATHY, WITHOUT LONG-TERM CURRENT USE OF INSULIN (H): ICD-10-CM

## 2019-03-18 DIAGNOSIS — E11.8 TYPE 2 DIABETES MELLITUS WITH COMPLICATION, WITHOUT LONG-TERM CURRENT USE OF INSULIN (H): Primary | ICD-10-CM

## 2019-03-18 DIAGNOSIS — Z71.89 DNR (DO NOT RESUSCITATE) DISCUSSION: ICD-10-CM

## 2019-03-18 LAB
HBA1C MFR BLD: 7.7 % (ref 4–6)
VIT B12 SERPL-MCNC: 1250 PG/ML (ref 180–914)

## 2019-03-18 PROCEDURE — G0463 HOSPITAL OUTPT CLINIC VISIT: HCPCS

## 2019-03-18 PROCEDURE — 36415 COLL VENOUS BLD VENIPUNCTURE: CPT | Performed by: FAMILY MEDICINE

## 2019-03-18 PROCEDURE — 83036 HEMOGLOBIN GLYCOSYLATED A1C: CPT | Performed by: FAMILY MEDICINE

## 2019-03-18 PROCEDURE — 99214 OFFICE O/P EST MOD 30 MIN: CPT | Performed by: FAMILY MEDICINE

## 2019-03-18 PROCEDURE — 82607 VITAMIN B-12: CPT | Performed by: FAMILY MEDICINE

## 2019-03-18 RX ORDER — SIMVASTATIN 10 MG
10 TABLET ORAL AT BEDTIME
Qty: 90 TABLET | Refills: 3 | Status: SHIPPED | OUTPATIENT
Start: 2019-03-18 | End: 2020-02-19

## 2019-03-18 ASSESSMENT — PAIN SCALES - GENERAL: PAINLEVEL: NO PAIN (0)

## 2019-03-18 ASSESSMENT — MIFFLIN-ST. JEOR: SCORE: 859.89

## 2019-03-18 NOTE — LETTER
March 18, 2019      Julia Martin  [address]   [facility] [address]        To Whom It May Concern:    Julia Martin has been determined to be legally blind due to macular degeneration and diabetic eye disease.      Sincerely,        GABINO LÓPEZ MD

## 2019-03-18 NOTE — NURSING NOTE
Previous A1C is at goal of <8  Lab Results   Component Value Date    A1C 7.8 10/16/2018    A1C 7.5 04/23/2018     Urine microalbumin:creatine: 9/26/17  Foot exam has appointment today  Eye exam up to date     Tobacco User no  Patient is on a daily aspirin  Patient is on a Statin.  Blood pressure today of:     BP Readings from Last 1 Encounters:   03/18/19 126/76      is at the goal of <139/89 for diabetics.    Mary Ramos LPN on 3/18/2019 at 9:11 AM

## 2019-03-18 NOTE — LETTER
"March 18, 2019      Julia Martin  [address]   [place] [facility] [facility] [address]        Dear Julia,     Here is a copy of your recent labs:    Results for orders placed or performed in visit on 03/18/19   Vitamin B12   Result Value Ref Range    Vitamin B12 1,250 (H) 180 - 914 pg/mL   Hemoglobin A1c   Result Value Ref Range    Hemoglobin A1C 7.7 (H) 4.0 - 6.0 %         Although the lab report states that both of these labs are \"high\", both of these labs are to be considered normal.      Sincerely,        GABINO LÓPEZ MD          "

## 2019-03-18 NOTE — PROGRESS NOTES
"Nursing Notes:   Mary Ramos LPN  3/18/2019  9:28 AM  Signed  Previous A1C is at goal of <8  Lab Results   Component Value Date    A1C 7.8 10/16/2018    A1C 7.5 04/23/2018     Urine microalbumin:creatine: 9/26/17  Foot exam has appointment today  Eye exam up to date     Tobacco User no  Patient is on a daily aspirin  Patient is on a Statin.  Blood pressure today of:     BP Readings from Last 1 Encounters:   03/18/19 126/76      is at the goal of <139/89 for diabetics.    Mary Ramos LPN on 3/18/2019 at 9:11 AM        SUBJECTIVE:   CC:  Julia Martin is a 89 year old female who presents to clinic today for the following health issues:  Type 2 diabetes     HPI  Julia Martin is a 89 year old female who presents for type 2 diabetes.  She is on oral medication for this.  She is on aspirin, has been on a full aspirin because of her history of TIA.  She is also on simvastatin.  She has a talking meter.  7 days average is 152 and 28d is 142.  Her meter readings are reviewed today.  In the past month she's had one reading of \"low\".   She didn't feel shaky or dizzy with this.  Her daughter was with her and had her eat a tangerine.  She then rechecked and it was up to 148.    Patient has macular degeneration and is legally blind.    She has a walker and uses this when outside but not in her apt.       No Known Allergies  Current Outpatient Medications   Medication     aspirin  MG EC tablet     blood glucose monitoring (ACCU-CHEK FASTCLIX) lancets     blood glucose monitoring (GLENIS CONTOUR) test strip     blood glucose monitoring (GLENIS MICROLET) lancets     blood glucose monitoring (PRODIGY NO CODING BLOOD GLUC) test strip     blood glucose monitoring ([employer] VOICE GLUCOSE MONITOR) meter device kit     Cholecalciferol (VITAMIN D3) 1000 UNITS CAPS     cyanocobalamin (RA VITAMIN B-12 TR) 1000 MCG TBCR     docusate sodium (COLACE) 100 MG tablet     glipiZIDE (GLUCOTROL) 5 MG tablet     Misc. Devices " (JOSELINE GOODSON) MISC     Multiple Vitamins-Minerals (PRESERVISION AREDS) CAPS     simvastatin (ZOCOR) 10 MG tablet     sitagliptin (JANUVIA) 50 MG tablet     No current facility-administered medications for this visit.       Past Medical History:   Diagnosis Date     Actinic keratosis     No Comments Provided     Deficiency of other specified B group vitamins (CODE)     4/15/2015     Hearing loss     No Comments Provided     Legal blindness      Type II diabetes mellitus with complication (H)     9/23/2015      Past Surgical History:   Procedure Laterality Date     COLONOSCOPY      No Comments Provided     EXTRACAPSULAR CATARACT EXTRATION WITH INTRAOCULAR LENS IMPLANT      1997 and 2001     HYSTERECTOMY TOTAL ABDOMINAL, BILATERAL SALPINGO-OOPHORECTOMY, COMBINED      age 55, fibroids     OTHER SURGICAL HISTORY      40282.0,[place] INJ ANTER CHMBR EYE MEDICATN,Left,for macular degeneration     RELEASE CARPAL TUNNEL      No Comments Provided     TONSILLECTOMY      age 5     Family History   Problem Relation Age of Onset     Breast Cancer Mother         Cancer-breast, breast cancer     Other - See Comments Mother         Stroke,stroke or sudden death     Parkinsonism Daughter          Parkinson's     Other - See Comments Brother          Parkinson's     Other - See Comments Brother          kidney failure     Cerebrovascular Disease Sister         Stroke,stroke     Ovarian Cancer Daughter         ovarian cancer       Review of Systems  At one point this winter she awakened in the middle of the night and had left arm pain - she took a full strength aspirin and then went back to sleep   The next morning she worked out on the machine for 15 minutes.      PHQ-2 Score:     PHQ-2 ( 1999 Pfizer) 3/18/2019 11/16/2018   Q1: Little interest or pleasure in doing things 0 0   Q2: Feeling down, depressed or hopeless 0 0   PHQ-2 Score 0 0         PHQ-9 SCORE 8/29/2016   PHQ-9 Total Score 2         OBJECTIVE:     /76   Pulse 84    Resp 18   Ht 1.524 m (5')   Wt 55.3 kg (122 lb)   Breastfeeding? No   BMI 23.83 kg/m    Body mass index is 23.83 kg/m .  Physical Exam   Constitutional: She appears well-developed and well-nourished.   Eyes:   Some increased tearing, mild lid lag on the right.   Cardiovascular: Normal rate.   Neurological:   Market decreased sensation in her feet.   Skin:   Skin tag under left eye   Nursing note and vitals reviewed.       Results for orders placed or performed in visit on 10/16/18   Hemoglobin A1c   Result Value Ref Range    Hemoglobin A1C 7.8 (H) 4.0 - 6.0 %   Vitamin B12   Result Value Ref Range    Vitamin B12 834 180 - 914 pg/mL         ASSESSMENT/PLAN:       ICD-10-CM    1. Type 2 diabetes mellitus with complication, without long-term current use of insulin (H) E11.8 simvastatin (ZOCOR) 10 MG tablet     sitagliptin (JANUVIA) 50 MG tablet     Hemoglobin A1c     Hemoglobin A1c   2. Type 2 diabetes mellitus with diabetic neuropathy, without long-term current use of insulin (H) E11.40    3. B12 deficiency E53.8 Vitamin B12     Vitamin B12   4. Macular degeneration (senile) of retina H35.30    5. DNR (do not resuscitate) discussion Z71.89             PLAN:  1.  Patient's A1c and B12 levels will be obtained today.  2.  Discussed goals with diabetic care are to prevent hyperglycemia related symptoms and complications but also avoiding episodes of hypoglycemia.  I would recommend her target A1c level to be between 7.5 and 8.5.  3.  In discussing her episode of chest pain, we again discussed CODE STATUS.  Patient is very clear that she would want to be DNR/DNI.  In discussing a scenario in which she had chest pain, heart attack, acute coronary syndrome, she states she would want treatment such as oxygen, nitro, morphine to treat her symptoms.  4.  I did write a letter for her today confirming her legal blindness.  She states she needs this for her taxes.    Follow-up in 6 months.      GABINO LÓPEZ,  MD  [facility]    This note was created using voice recognition software and was screened for errors in transcription.

## 2019-03-22 DIAGNOSIS — E11.8 TYPE 2 DIABETES MELLITUS WITH COMPLICATION, WITHOUT LONG-TERM CURRENT USE OF INSULIN (H): ICD-10-CM

## 2019-03-22 RX ORDER — SIMVASTATIN 10 MG
10 TABLET ORAL AT BEDTIME
Qty: 90 TABLET | Refills: 3 | OUTPATIENT
Start: 2019-03-22

## 2019-03-22 NOTE — TELEPHONE ENCOUNTER
Filled 03/18/19 #90 x 3. Pharmacy alerted. Unable to complete prescription refill per RNMedication Refill Policy.................... Mary Paredes ....................  3/22/2019   4:00 PM    simvastatin (ZOCOR) 10 MG tablet 90 tablet 3 3/18/2019  No   Sig - Route: Take 1 tablet (10 mg) by mouth At Bedtime - Oral   Sent to pharmacy as: simvastatin (ZOCOR) 10 MG tablet   Class: E-Prescribe   Order: [id number]   E-Prescribing Status: Receipt confirmed by pharmacy (3/18/2019  9:45 AM CDT)   Printout Tracking     External Result Report   Pharmacy     [id number] - [place], [place] - [address]

## 2019-04-24 DIAGNOSIS — E11.8 TYPE 2 DIABETES MELLITUS WITH COMPLICATION, WITHOUT LONG-TERM CURRENT USE OF INSULIN (H): ICD-10-CM

## 2019-04-26 RX ORDER — GLIPIZIDE 5 MG/1
TABLET ORAL
Qty: 270 TABLET | Refills: 3 | Status: SHIPPED | OUTPATIENT
Start: 2019-04-26

## 2019-04-26 NOTE — TELEPHONE ENCOUNTER
Routing refill request to provider for review/approval because:  Labs not current:  Lipid and micro albumin    LOV: 3/18/19      Lalitha Smith RN on 4/26/2019 at 9:52 AM

## 2019-07-09 ENCOUNTER — OFFICE VISIT (OUTPATIENT)
Dept: FAMILY MEDICINE | Facility: OTHER | Age: 84
End: 2019-07-09
Attending: FAMILY MEDICINE
Payer: MEDICARE

## 2019-07-09 VITALS
HEART RATE: 84 BPM | WEIGHT: 121 LBS | TEMPERATURE: 98 F | BODY MASS INDEX: 23.75 KG/M2 | DIASTOLIC BLOOD PRESSURE: 88 MMHG | SYSTOLIC BLOOD PRESSURE: 130 MMHG | RESPIRATION RATE: 18 BRPM | HEIGHT: 60 IN

## 2019-07-09 DIAGNOSIS — H90.3 BILATERAL SENSORINEURAL HEARING LOSS: ICD-10-CM

## 2019-07-09 DIAGNOSIS — E53.8 B12 DEFICIENCY: ICD-10-CM

## 2019-07-09 DIAGNOSIS — E11.8 TYPE 2 DIABETES MELLITUS WITH COMPLICATION, WITHOUT LONG-TERM CURRENT USE OF INSULIN (H): Primary | ICD-10-CM

## 2019-07-09 DIAGNOSIS — E11.40 TYPE 2 DIABETES MELLITUS WITH DIABETIC NEUROPATHY, WITHOUT LONG-TERM CURRENT USE OF INSULIN (H): ICD-10-CM

## 2019-07-09 DIAGNOSIS — H35.30 MACULAR DEGENERATION (SENILE) OF RETINA: ICD-10-CM

## 2019-07-09 LAB
ALBUMIN SERPL-MCNC: 4.5 G/DL (ref 3.5–5.7)
ALP SERPL-CCNC: 52 U/L (ref 34–104)
ALT SERPL W P-5'-P-CCNC: 14 U/L (ref 7–52)
ANION GAP SERPL CALCULATED.3IONS-SCNC: 12 MMOL/L (ref 3–14)
AST SERPL W P-5'-P-CCNC: 19 U/L (ref 13–39)
BILIRUB SERPL-MCNC: 0.6 MG/DL (ref 0.3–1)
BUN SERPL-MCNC: 16 MG/DL (ref 7–25)
CALCIUM SERPL-MCNC: 9.6 MG/DL (ref 8.6–10.3)
CHLORIDE SERPL-SCNC: 102 MMOL/L (ref 98–107)
CHOLEST SERPL-MCNC: 183 MG/DL
CO2 SERPL-SCNC: 26 MMOL/L (ref 21–31)
CREAT SERPL-MCNC: 1.01 MG/DL (ref 0.6–1.2)
GFR SERPL CREATININE-BSD FRML MDRD: 51 ML/MIN/{1.73_M2}
GLUCOSE SERPL-MCNC: 123 MG/DL (ref 70–105)
HBA1C MFR BLD: 7.8 % (ref 4–6)
HDLC SERPL-MCNC: 59 MG/DL (ref 23–92)
LDLC SERPL CALC-MCNC: 91 MG/DL
NONHDLC SERPL-MCNC: 124 MG/DL
POTASSIUM SERPL-SCNC: 3.8 MMOL/L (ref 3.5–5.1)
PROT SERPL-MCNC: 8.3 G/DL (ref 6.4–8.9)
SODIUM SERPL-SCNC: 140 MMOL/L (ref 134–144)
TRIGL SERPL-MCNC: 166 MG/DL

## 2019-07-09 PROCEDURE — 36415 COLL VENOUS BLD VENIPUNCTURE: CPT | Mod: ZL | Performed by: FAMILY MEDICINE

## 2019-07-09 PROCEDURE — 83036 HEMOGLOBIN GLYCOSYLATED A1C: CPT | Mod: ZL | Performed by: FAMILY MEDICINE

## 2019-07-09 PROCEDURE — 80053 COMPREHEN METABOLIC PANEL: CPT | Mod: ZL | Performed by: FAMILY MEDICINE

## 2019-07-09 PROCEDURE — 99214 OFFICE O/P EST MOD 30 MIN: CPT | Performed by: FAMILY MEDICINE

## 2019-07-09 PROCEDURE — G0463 HOSPITAL OUTPT CLINIC VISIT: HCPCS

## 2019-07-09 PROCEDURE — 80061 LIPID PANEL: CPT | Mod: ZL | Performed by: FAMILY MEDICINE

## 2019-07-09 ASSESSMENT — ENCOUNTER SYMPTOMS
PSYCHIATRIC NEGATIVE: 1
HEADACHES: 0
GASTROINTESTINAL NEGATIVE: 1
CARDIOVASCULAR NEGATIVE: 1
SPEECH DIFFICULTY: 0

## 2019-07-09 ASSESSMENT — MIFFLIN-ST. JEOR: SCORE: 855.35

## 2019-07-09 ASSESSMENT — PAIN SCALES - GENERAL: PAINLEVEL: NO PAIN (0)

## 2019-07-09 NOTE — NURSING NOTE
Previous A1C is at goal of <8  Lab Results   Component Value Date    A1C 7.7 03/18/2019    A1C 7.8 10/16/2018    A1C 7.5 04/23/2018     Urine microalbumin:creatine: 8/29/16  Foot exam has appointment today   Eye exam 2/27/19    Tobacco User no  Patient is on a daily aspirin  Patient is on a Statin.  Blood pressure today of:     BP Readings from Last 1 Encounters:   03/18/19 126/76      is at the goal of <139/89 for diabetics.    Mary Ramos LPN on 7/9/2019 at 11:34 AM

## 2019-07-09 NOTE — PROGRESS NOTES
Nursing Notes:   Mary Ramos LPN  7/9/2019 11:39 AM  Signed  Previous A1C is at goal of <8  Lab Results   Component Value Date    A1C 7.7 03/18/2019    A1C 7.8 10/16/2018    A1C 7.5 04/23/2018     Urine microalbumin:creatine: 8/29/16  Foot exam has appointment today   Eye exam 2/27/19    Tobacco User no  Patient is on a daily aspirin  Patient is on a Statin.  Blood pressure today of:     BP Readings from Last 1 Encounters:   03/18/19 126/76      is at the goal of <139/89 for diabetics.    Mary Ramos LPN on 7/9/2019 at 11:34 AM        SUBJECTIVE:   CC:  Julia Martin is a 89 year old female who presents to clinic today for the following health issues:  Follow up of type 2 diabetes     HPI  Julia Martin is a 89 year old female who presents for follow up of type 2 diabetes.  She is on aspirin, she is on a statin.  She has had a remote history of a stroke.  She has peripheral neuropathy, likely a combination of type 2 diabetes and B12 deficiency.  Due to her severe vision changes, she does not check her blood sugars regularly.  She does have a talking meter, but not see well enough to operate it.  1 of her neighbors checks her blood sugar for her about twice a week.  She brings in her meter for review today.  Her 28-day average is 180.  Patient cannot recall the last time she had a blood sugar less than 80, or the last time she had symptoms that would be consistent with hypoglycemia.    Concerns: balance, she does have a walker.  She hasn't fallen but has come close.  She has a cat, and still is able to get up and down from the floor.    Vision:  Has severe macular degeneration.  She can't read, can't see faces.  Has a talking meter.  She had her last eye exam in February, mild retinopathy.  Prior to that, she had seen the doctor that was giving her injections for macular degeneration, injections were discontinued.  Hearing aids :  Currently in for repair.    Appetite: breakfast and lunch are good,  eats small supper.  No nausea, no heartburn.      Neuropathy:  Feet and hands.  Hands/fingers will tingle.  Recent VITAMIN B12 was normal.     No Known Allergies  Current Outpatient Medications   Medication     aspirin  MG EC tablet     blood glucose monitoring (ACCU-CHEK FASTCLIX) lancets     blood glucose monitoring (GLENIS CONTOUR) test strip     blood glucose monitoring (GLENIS MICROLET) lancets     blood glucose monitoring (PRODIGY NO CODING BLOOD GLUC) test strip     blood glucose monitoring ([employer] VOICE GLUCOSE MONITOR) meter device kit     Cholecalciferol (VITAMIN D3) 1000 UNITS CAPS     cyanocobalamin (RA VITAMIN B-12 TR) 1000 MCG TBCR     docusate sodium (COLACE) 100 MG tablet     glipiZIDE (GLUCOTROL) 5 MG tablet     Multiple Vitamins-Minerals (PRESERVISION AREDS) CAPS     simvastatin (ZOCOR) 10 MG tablet     sitagliptin (JANUVIA) 50 MG tablet     No current facility-administered medications for this visit.       Past Medical History:   Diagnosis Date     Actinic keratosis     No Comments Provided     Deficiency of other specified B group vitamins (CODE)     4/15/2015     Hearing loss     No Comments Provided     Legal blindness      Type II diabetes mellitus with complication (H)     9/23/2015      Past Surgical History:   Procedure Laterality Date     COLONOSCOPY      No Comments Provided     EXTRACAPSULAR CATARACT EXTRATION WITH INTRAOCULAR LENS IMPLANT      1997 and 2001     HYSTERECTOMY TOTAL ABDOMINAL, BILATERAL SALPINGO-OOPHORECTOMY, COMBINED      age 55, fibroids     OTHER SURGICAL HISTORY      84138.0,[place] INJ ANTER CHMBR EYE MEDICATN,Left,for macular degeneration     RELEASE CARPAL TUNNEL      No Comments Provided     TONSILLECTOMY      age 5     Family History   Problem Relation Age of Onset     Breast Cancer Mother         Cancer-breast, breast cancer     Other - See Comments Mother         Stroke,stroke or sudden death     Parkinsonism Daughter          Parkinson's     Other - See  Comments Brother          Parkinson's     Other - See Comments Brother          kidney failure     Cerebrovascular Disease Sister         Stroke,stroke     Ovarian Cancer Daughter         ovarian cancer       Review of Systems   Constitutional:        Patient lives in an apartment at an assisted living facility.   HENT: Positive for hearing loss.    Eyes: Positive for visual disturbance.        Denies visual hallucinations   Cardiovascular: Negative.    Gastrointestinal: Negative.    Musculoskeletal:        She is seen for foot care every 3 months.   Neurological: Negative for speech difficulty and headaches.   Psychiatric/Behavioral: Negative.         PHQ-2 Score:     PHQ-2 ( 1999 Pfizer) 7/9/2019 3/18/2019   Q1: Little interest or pleasure in doing things 0 0   Q2: Feeling down, depressed or hopeless 0 0   PHQ-2 Score 0 0         PHQ-9 SCORE 8/29/2016   PHQ-9 Total Score 2         OBJECTIVE:     /88   Pulse 84   Temp 98  F (36.7  C) (Tympanic)   Resp 18   Ht 1.524 m (5')   Wt 54.9 kg (121 lb)   Breastfeeding? No   BMI 23.63 kg/m    Body mass index is 23.63 kg/m .  Physical Exam   Constitutional: She appears well-developed and well-nourished.   Eyes:   Increased injection of her eyes, in particular right lower eyelid   Cardiovascular: Normal rate.   Pulmonary/Chest: She has no wheezes. She has no rales.   Musculoskeletal:   Nail hygiene is good, no skin breakdown.   Neurological:   Absent sensation in her feet, this extends to her knees.  Shortness/recent memory recall is intact   Skin:   Scattered thick keratoses of her shins   Nursing note and vitals reviewed.       Results for orders placed or performed in visit on 03/18/19   Vitamin B12   Result Value Ref Range    Vitamin B12 1,250 (H) 180 - 914 pg/mL   Hemoglobin A1c   Result Value Ref Range    Hemoglobin A1C 7.7 (H) 4.0 - 6.0 %         ASSESSMENT/PLAN:       ICD-10-CM    1. Type 2 diabetes mellitus with complication, without long-term current use  of insulin (H) E11.8 Lipid Panel     Hemoglobin A1c     Comprehensive Metabolic Panel   2. Type 2 diabetes mellitus with diabetic neuropathy, without long-term current use of insulin (H) E11.40    3. B12 deficiency E53.8    4. Macular degeneration (senile) of retina H35.30    5. Bilateral sensorineural hearing loss H90.3             PLAN:  1.  Labs are due today include A1c, metabolic panel, lipid panel.  We discussed treatment goals, based on her age.  Treatment goals are to limit hyperglycemia related side effects, avoid hypoglycemia.  2.  Discussed fall prevention.  3.  Continue with B12 replacement.  4.  Patient had some ambivalence regarding getting her hearing aids fixed.  Given her market vision change, I do recommend having these fixed to help with socialization.    Follow-up in 3 months.      GABINO LÓPEZ MD  [facility]    This note was created using voice recognition software and was screened for errors in transcription.

## 2019-07-09 NOTE — LETTER
July 16, 2019      Julia Martin  [address]   [place] [address]        Dear Julia,     Here is a copy of your recent labs:    Results for orders placed or performed in visit on 07/09/19   Comprehensive Metabolic Panel   Result Value Ref Range    Sodium 140 134 - 144 mmol/L    Potassium 3.8 3.5 - 5.1 mmol/L    Chloride 102 98 - 107 mmol/L    Carbon Dioxide 26 21 - 31 mmol/L    Anion Gap 12 3 - 14 mmol/L    Glucose 123 (H) 70 - 105 mg/dL    Urea Nitrogen 16 7 - 25 mg/dL    Creatinine 1.01 0.60 - 1.20 mg/dL    GFR Estimate 51 (L) >60 mL/min/[1.73_m2]    GFR Estimate If Black 61 >60 mL/min/[1.73_m2]    Calcium 9.6 8.6 - 10.3 mg/dL    Bilirubin Total 0.6 0.3 - 1.0 mg/dL    Albumin 4.5 3.5 - 5.7 g/dL    Protein Total 8.3 6.4 - 8.9 g/dL    Alkaline Phosphatase 52 34 - 104 U/L    ALT 14 7 - 52 U/L    AST 19 13 - 39 U/L   Hemoglobin A1c   Result Value Ref Range    Hemoglobin A1C 7.8 (H) 4.0 - 6.0 %   Lipid Panel   Result Value Ref Range    Cholesterol 183 <200 mg/dL    Triglycerides 166 (H) <150 mg/dL    HDL Cholesterol 59 23 - 92 mg/dL    LDL Cholesterol Calculated 91 <100 mg/dL    Non HDL Cholesterol 124 <130 mg/dL         Overall, these labs are stable.  In regards to your A1c results, the goal is to keep this under 8%.  At this point in time, no changes in your medications are needed.      Sincerely,        GABINO LÓPEZ MD

## 2019-10-07 ENCOUNTER — ALLIED HEALTH/NURSE VISIT (OUTPATIENT)
Dept: FAMILY MEDICINE | Facility: OTHER | Age: 84
End: 2019-10-07
Attending: FAMILY MEDICINE
Payer: MEDICARE

## 2019-10-07 DIAGNOSIS — Z23 NEED FOR PROPHYLACTIC VACCINATION AND INOCULATION AGAINST INFLUENZA: Primary | ICD-10-CM

## 2019-10-07 PROCEDURE — 90662 IIV NO PRSV INCREASED AG IM: CPT

## 2019-10-07 PROCEDURE — 90471 IMMUNIZATION ADMIN: CPT

## 2019-12-09 ENCOUNTER — OFFICE VISIT (OUTPATIENT)
Dept: FAMILY MEDICINE | Facility: OTHER | Age: 84
End: 2019-12-09
Attending: FAMILY MEDICINE
Payer: MEDICARE

## 2019-12-09 VITALS
DIASTOLIC BLOOD PRESSURE: 88 MMHG | HEIGHT: 60 IN | RESPIRATION RATE: 16 BRPM | SYSTOLIC BLOOD PRESSURE: 134 MMHG | BODY MASS INDEX: 23.63 KG/M2 | HEART RATE: 92 BPM

## 2019-12-09 DIAGNOSIS — Z71.89 DNR (DO NOT RESUSCITATE) DISCUSSION: ICD-10-CM

## 2019-12-09 DIAGNOSIS — E53.8 B12 DEFICIENCY: ICD-10-CM

## 2019-12-09 DIAGNOSIS — E11.8 TYPE 2 DIABETES MELLITUS WITH COMPLICATION, WITHOUT LONG-TERM CURRENT USE OF INSULIN (H): Primary | ICD-10-CM

## 2019-12-09 DIAGNOSIS — G56.02 CARPAL TUNNEL SYNDROME OF LEFT WRIST: ICD-10-CM

## 2019-12-09 DIAGNOSIS — H35.30 MACULAR DEGENERATION (SENILE) OF RETINA: ICD-10-CM

## 2019-12-09 LAB
ANION GAP SERPL CALCULATED.3IONS-SCNC: 10 MMOL/L (ref 3–14)
BUN SERPL-MCNC: 21 MG/DL (ref 7–25)
CALCIUM SERPL-MCNC: 9.5 MG/DL (ref 8.6–10.3)
CHLORIDE SERPL-SCNC: 101 MMOL/L (ref 98–107)
CO2 SERPL-SCNC: 25 MMOL/L (ref 21–31)
CREAT SERPL-MCNC: 1.11 MG/DL (ref 0.6–1.2)
GFR SERPL CREATININE-BSD FRML MDRD: 45 ML/MIN/{1.73_M2}
GLUCOSE SERPL-MCNC: 209 MG/DL (ref 70–105)
HBA1C MFR BLD: 8 % (ref 4–6)
POTASSIUM SERPL-SCNC: 4.1 MMOL/L (ref 3.5–5.1)
SODIUM SERPL-SCNC: 136 MMOL/L (ref 134–144)
VIT B12 SERPL-MCNC: 1232 PG/ML (ref 180–914)

## 2019-12-09 PROCEDURE — 80048 BASIC METABOLIC PNL TOTAL CA: CPT | Mod: ZL | Performed by: FAMILY MEDICINE

## 2019-12-09 PROCEDURE — 83036 HEMOGLOBIN GLYCOSYLATED A1C: CPT | Mod: ZL | Performed by: FAMILY MEDICINE

## 2019-12-09 PROCEDURE — 99214 OFFICE O/P EST MOD 30 MIN: CPT | Performed by: FAMILY MEDICINE

## 2019-12-09 PROCEDURE — 36415 COLL VENOUS BLD VENIPUNCTURE: CPT | Mod: ZL | Performed by: FAMILY MEDICINE

## 2019-12-09 PROCEDURE — G0463 HOSPITAL OUTPT CLINIC VISIT: HCPCS

## 2019-12-09 PROCEDURE — 82607 VITAMIN B-12: CPT | Mod: ZL | Performed by: FAMILY MEDICINE

## 2019-12-09 ASSESSMENT — PAIN SCALES - GENERAL: PAINLEVEL: NO PAIN (0)

## 2019-12-09 ASSESSMENT — ENCOUNTER SYMPTOMS
CONSTITUTIONAL NEGATIVE: 1
GASTROINTESTINAL NEGATIVE: 1
NUMBNESS: 1
PSYCHIATRIC NEGATIVE: 1
FREQUENCY: 1

## 2019-12-09 NOTE — PROGRESS NOTES
Nursing Notes:   Mary Ramos LPN  12/9/2019 11:40 AM  Signed  Previous A1C is at goal of <8  Lab Results   Component Value Date    A1C 7.8 07/09/2019    A1C 7.7 03/18/2019    A1C 7.8 10/16/2018    A1C 7.5 04/23/2018     Urine microalbumin:creatine: 9/26/17  Foot exam has appointment today   Eye exam no     Tobacco User no   Patient is on a daily aspirin  Patient is on a Statin.  Blood pressure today of:     BP Readings from Last 1 Encounters:   07/09/19 130/88      is at the goal of <139/89 for diabetics.    Mary Ramos LPN on 12/9/2019 at 11:34 AM        SUBJECTIVE:   CC:  Julia Martin is a 89 year old female who presents to clinic today for the following health issues:  Follow up of type 2 diabetes.    HPI  Julia Martin is a 89 year old female who presents for follow up of type 2 diabetes.  She is legally blind.    Her neighbor helps her check her blood sugars about once a day.  Her meter talks, but she can't see to get the test strip in.  7 days average was 135, 14 and 28 day average is 170.    No low blood sugars, none under 80.  Frequently will get to be over 200.      Numbness in her left hand - 2-3rd fingers.  She is right handed.  Previous left carpal tunnel surgery.      Her balance is poor.  She uses a cane.  No falls.  She feels she has better balance when barefoot/no shoes on.    Patient has macular degeneration.  She wears hearing aids.           No Known Allergies  Current Outpatient Medications   Medication     aspirin  MG EC tablet     blood glucose monitoring (ACCU-CHEK FASTCLIX) lancets     blood glucose monitoring (GLENIS CONTOUR) test strip     blood glucose monitoring (GLENIS MICROLET) lancets     blood glucose monitoring (PRODIGY NO CODING BLOOD GLUC) test strip     blood glucose monitoring ([employer]alt[employer] VOICE GLUCOSE MONITOR) meter device kit     Cholecalciferol (VITAMIN D3) 1000 UNITS CAPS     cyanocobalamin (RA VITAMIN B-12 TR) 1000 MCG TBCR     docusate sodium (COLACE)  100 MG tablet     glipiZIDE (GLUCOTROL) 5 MG tablet     Multiple Vitamins-Minerals (PRESERVISION AREDS) CAPS     simvastatin (ZOCOR) 10 MG tablet     sitagliptin (JANUVIA) 50 MG tablet     No current facility-administered medications for this visit.       Past Medical History:   Diagnosis Date     Actinic keratosis     No Comments Provided     Deficiency of other specified B group vitamins (CODE)     4/15/2015     Hearing loss     No Comments Provided     Legal blindness      Type II diabetes mellitus with complication (H)     9/23/2015      Past Surgical History:   Procedure Laterality Date     COLONOSCOPY      No Comments Provided     EXTRACAPSULAR CATARACT EXTRATION WITH INTRAOCULAR LENS IMPLANT      1997 and 2001     HYSTERECTOMY TOTAL ABDOMINAL, BILATERAL SALPINGO-OOPHORECTOMY, COMBINED      age 55, fibroids     OTHER SURGICAL HISTORY      94201.0,[place] INJ ANTER CHMBR EYE MEDICATN,Left,for macular degeneration     RELEASE CARPAL TUNNEL      No Comments Provided     TONSILLECTOMY      age 5     Family History   Problem Relation Age of Onset     Breast Cancer Mother         Cancer-breast, breast cancer     Other - See Comments Mother         Stroke,stroke or sudden death     Parkinsonism Daughter          Parkinson's     Other - See Comments Brother          Parkinson's     Other - See Comments Brother          kidney failure     Cerebrovascular Disease Sister         Stroke,stroke     Ovarian Cancer Daughter         ovarian cancer       Review of Systems   Constitutional: Negative.    HENT: Positive for hearing loss.    Eyes: Positive for visual disturbance.   Gastrointestinal: Negative.    Genitourinary: Positive for frequency.   Musculoskeletal: Positive for gait problem.        Problems with her gait due to balance and vision   Neurological: Positive for numbness.   Psychiatric/Behavioral: Negative.         PHQ-2 Score:     PHQ-2 ( 1999 Pfizer) 12/9/2019 7/9/2019   Q1: Little interest or pleasure in doing  things 0 0   Q2: Feeling down, depressed or hopeless 0 0   PHQ-2 Score 0 0         PHQ-9 SCORE 8/29/2016   PHQ-9 Total Score 2         OBJECTIVE:     /88   Pulse 92   Resp 16   Ht 1.524 m (5')   Breastfeeding No   BMI 23.63 kg/m    Body mass index is 23.63 kg/m .  Physical Exam  Vitals signs and nursing note reviewed.   Constitutional:       Appearance: Normal appearance. She is normal weight.   Eyes:      Comments: Increased tearing left eye   Cardiovascular:      Rate and Rhythm: Normal rate and regular rhythm.   Musculoskeletal:      Comments: Decreased sensation with monofilament testing of her left second and third fingers.   Neurological:      Mental Status: She is alert.   Psychiatric:         Mood and Affect: Mood normal.         Thought Content: Thought content normal.          No results found for any visits on 12/09/19.      ASSESSMENT/PLAN:       ICD-10-CM    1. Type 2 diabetes mellitus with complication, without long-term current use of insulin (H) E11.8 Hemoglobin A1c     Hemoglobin A1c     Basic Metabolic Panel   2. Carpal tunnel syndrome of left wrist G56.02    3. B12 deficiency E53.8 Vitamin B12   4. Macular degeneration (senile) of retina H35.30    5. DNR (do not resuscitate) discussion Z71.89             PLAN:  1.  A1C, BMP due today.  She is also due for follow-up B12 level.  Reviewed with her that goal with her diabetes care is to manage symptoms of hyperglycemia without inducing hypoglycemia.  2.  We discussed her numbness in her left hand, this may be related to carpal tunnel syndrome.  She does also have B12 deficiency, B12 levels being rechecked today.  If B12 level is normal, she would decline additional testing/evaluation for her symptoms.  3.  Discussed safety at [facility] due to her hearing, vision, and balance issues.      GABINO LÓPEZ MD  [facility]    This note was created using voice recognition software and was screened for errors  in transcription.

## 2019-12-09 NOTE — LETTER
December 9, 2019      Julia Martin  [address]   [place] [address]        Dear Julia,     Here is a copy of your recent labs:    Results for orders placed or performed in visit on 12/09/19   Hemoglobin A1c     Status: Abnormal   Result Value Ref Range    Hemoglobin A1C 8.0 (H) 4.0 - 6.0 %   Vitamin B12     Status: Abnormal   Result Value Ref Range    Vitamin B12 1,232 (H) 180 - 914 pg/mL   Basic Metabolic Panel     Status: Abnormal   Result Value Ref Range    Sodium 136 134 - 144 mmol/L    Potassium 4.1 3.5 - 5.1 mmol/L    Chloride 101 98 - 107 mmol/L    Carbon Dioxide 25 21 - 31 mmol/L    Anion Gap 10 3 - 14 mmol/L    Glucose 209 (H) 70 - 105 mg/dL    Urea Nitrogen 21 7 - 25 mg/dL    Creatinine 1.11 0.60 - 1.20 mg/dL    GFR Estimate 45 (L) >60 mL/min/[1.73_m2]    GFR Estimate If Black 55 (L) >60 mL/min/[1.73_m2]    Calcium 9.5 8.6 - 10.3 mg/dL         These results are stable.  Please follow up in 3 months.        Sincerely,        GABINO LÓPEZ MD

## 2019-12-09 NOTE — NURSING NOTE
Previous A1C is at goal of <8  Lab Results   Component Value Date    A1C 7.8 07/09/2019    A1C 7.7 03/18/2019    A1C 7.8 10/16/2018    A1C 7.5 04/23/2018     Urine microalbumin:creatine: 9/26/17  Foot exam has appointment today   Eye exam no     Tobacco User no   Patient is on a daily aspirin  Patient is on a Statin.  Blood pressure today of:     BP Readings from Last 1 Encounters:   07/09/19 130/88      is at the goal of <139/89 for diabetics.    Mary Ramos LPN on 12/9/2019 at 11:34 AM

## 2020-02-18 DIAGNOSIS — E11.8 TYPE 2 DIABETES MELLITUS WITH COMPLICATION, WITHOUT LONG-TERM CURRENT USE OF INSULIN (H): ICD-10-CM

## 2020-02-18 NOTE — LETTER
February 19, 2020      Julia Martin  [address]   [facility] [address]        Dear Julia,     A refill request was received from your pharmacy for Atorvastatin and Januvia.    Additional refills require an office visit with Dr. Anibal Santos for annual review and labs.    Please call [phone] to schedule appointment.      Sincerely,      Refill Nurse

## 2020-02-19 DIAGNOSIS — E11.8 TYPE 2 DIABETES MELLITUS WITH COMPLICATION, WITHOUT LONG-TERM CURRENT USE OF INSULIN (H): ICD-10-CM

## 2020-02-19 RX ORDER — SITAGLIPTIN 50 MG/1
TABLET, FILM COATED ORAL
Qty: 90 TABLET | Refills: 0 | Status: SHIPPED | OUTPATIENT
Start: 2020-02-19

## 2020-02-19 RX ORDER — BLOOD SUGAR DIAGNOSTIC
STRIP MISCELLANEOUS
Qty: 200 EACH | OUTPATIENT
Start: 2020-02-19

## 2020-02-19 RX ORDER — SIMVASTATIN 10 MG
TABLET ORAL
Qty: 90 TABLET | Refills: 0 | Status: SHIPPED | OUTPATIENT
Start: 2020-02-19

## 2020-02-19 NOTE — TELEPHONE ENCOUNTER
"Requested Prescriptions   Pending Prescriptions Disp Refills     PRODIGY NO CODING BLOOD GLUC VI test strip [Pharmacy Med Name: PRODIGY NO CODING TEST STRIP] 200 each      Sig: TEST BLOOD SUGAR TWICE A DAY       Diabetic Supplies Protocol Passed - 2/19/2020 10:14 AM        Passed - Medication is active on med list        Passed - Patient is 18 years of age or older        Passed - Recent (6 mo) or future (30 days) visit within the authorizing provider's specialty     Patient had office visit in the last 6 months or has a visit in the next 30 days with authorizing provider.  See \"Patient Info\" tab in inbasket, or \"Choose Columns\" in Meds & Orders section of the refill encounter.            LOV 12/9/19 Prescription approved per [facility] Refill Protocol.  Idania Roy RN on 2/19/2020 at 11:08 AM    "

## 2020-02-26 ENCOUNTER — OFFICE VISIT (OUTPATIENT)
Dept: FAMILY MEDICINE | Facility: OTHER | Age: 85
End: 2020-02-26
Attending: PHYSICIAN ASSISTANT
Payer: MEDICARE

## 2020-02-26 VITALS
TEMPERATURE: 97.7 F | DIASTOLIC BLOOD PRESSURE: 80 MMHG | RESPIRATION RATE: 20 BRPM | HEART RATE: 62 BPM | SYSTOLIC BLOOD PRESSURE: 110 MMHG

## 2020-02-26 DIAGNOSIS — M54.6 ACUTE BILATERAL THORACIC BACK PAIN: Primary | ICD-10-CM

## 2020-02-26 PROCEDURE — 99213 OFFICE O/P EST LOW 20 MIN: CPT | Performed by: PHYSICIAN ASSISTANT

## 2020-02-26 PROCEDURE — G0463 HOSPITAL OUTPT CLINIC VISIT: HCPCS

## 2020-02-26 ASSESSMENT — PAIN SCALES - GENERAL: PAINLEVEL: NO PAIN (0)

## 2020-02-26 NOTE — NURSING NOTE
Chief Complaint   Patient presents with     Back Pain     between shoulder blades         Medication Reconciliation: complete    Daria Arzate, LPN

## 2020-02-26 NOTE — PATIENT INSTRUCTIONS
Back pain:  Encouraged to take tylenol for relief up to 4 times per day as needed for pain.   Encouraged to use heat 15 minutes at a time several times per day to decrease pain. Return to clinic in the next few days as necessary for persistent pain. Return to clinic with any change or worsening of symptoms.     Statement Selected

## 2020-02-26 NOTE — PROGRESS NOTES
Nursing Notes:   Daria Arzate LPN  2/26/2020 11:08 AM  Signed  Chief Complaint   Patient presents with     Back Pain     between shoulder blades         Medication Reconciliation: complete    Daria Arzate LPN      HPI:    Julia Martin is a 89 year old female who presents for pain inbetween shoulder blades.  She has had symptoms over the last 2 days.  She has noticed some mild dull pain in between her shoulder blades.  She states last Friday 2/21 she did almost fall and had to catch herself before she fell to the ground.  She did not fall to the ground at the time.  Does not recall pain immediately after the incident.  2 days ago she noticed pain in between her shoulder blades.  She did have some mild neck pain yesterday and none since then.  Pain initially started at bedtime.  It then presented yesterday during the daytime.  Currently walks with a walker.  She does note that she tends to bend forward when she is walking.  She did notice that her back pain was better after a bowel movement this morning.  No bruising or swelling.  No chest pain, palpitations, problems breathing, lightheadedness or dizziness, headaches, arm pain, jaw pain.  Not using ice or heat.  Has not tried ibuprofen or Tylenol.  Does take an aspirin 325 mg at bedtime.      Past Medical History:   Diagnosis Date     Actinic keratosis     No Comments Provided     Deficiency of other specified B group vitamins (CODE)     4/15/2015     Hearing loss     No Comments Provided     Legal blindness      Type II diabetes mellitus with complication (H)     9/23/2015       Past Surgical History:   Procedure Laterality Date     COLONOSCOPY      No Comments Provided     EXTRACAPSULAR CATARACT EXTRATION WITH INTRAOCULAR LENS IMPLANT      1997 and 2001     HYSTERECTOMY TOTAL ABDOMINAL, BILATERAL SALPINGO-OOPHORECTOMY, COMBINED      age 55, fibroids     OTHER SURGICAL HISTORY      55423.0,[place] INJ ANTER CHMBR EYE MEDICATN,Left,for macular  degeneration     RELEASE CARPAL TUNNEL      No Comments Provided     TONSILLECTOMY      age 5       Family History   Problem Relation Age of Onset     Breast Cancer Mother         Cancer-breast, breast cancer     Other - See Comments Mother         Stroke,stroke or sudden death     Parkinsonism Daughter          Parkinson's     Other - See Comments Brother          Parkinson's     Other - See Comments Brother          kidney failure     Cerebrovascular Disease Sister         Stroke,stroke     Ovarian Cancer Daughter         ovarian cancer       Social History     Tobacco Use     Smoking status: Former Smoker     Smokeless tobacco: Never Used   Substance Use Topics     Alcohol use: No     Alcohol/week: 0.0 standard drinks       Current Outpatient Medications   Medication Sig Dispense Refill     aspirin  MG EC tablet Take 325 mg by mouth daily with food       blood glucose (PRODIGY NO CODING BLOOD GLUC) VI test strip Use to test blood sugar 2 times daily or as directed. 200 strip 0     blood glucose monitoring (ACCU-CHEK FASTCLIX) lancets Use to test blood sugars one to two times daily as directed. Dx: E11.9 102 each 2     blood glucose monitoring (Blend CONTOUR) test strip Test blood sugars once per day Up to 2 times daily if necessary.  Dx: E11.9       blood glucose monitoring (GLENIS MICROLET) lancets Use to test blood sugars one to two times daily as directed.  Dx: E11.9       blood glucose monitoring (ComputeNext VOICE GLUCOSE MONITOR) meter device kit Use to test blood sugar 2 times daily or as directed. 1 kit 0     Cholecalciferol (VITAMIN D3) 1000 UNITS CAPS Take 1,000 Units by mouth daily       cyanocobalamin (RA VITAMIN B-12 TR) 1000 MCG TBCR Take 1,000 mcg by mouth daily       glipiZIDE (GLUCOTROL) 5 MG tablet TAKE 2 TABLETS BY MOUTH WITH BREAKFAST AND 1 TABLET AT SUPPER 270 tablet 3     JANUVIA 50 MG PO tablet TAKE 1 TABLET ONCE DAILY EVERY MORNING 90 tablet 0     Multiple Vitamins-Minerals (PRESERVISION  AREDS) CAPS        SIMVASTATIN 10 MG PO tablet TAKE 1 TABLET BY MOUTH NIGHTLY AT BEDTIME 90 tablet 0       No Known Allergies    REVIEW OF SYSTEMS:  Refer to HPI.    EXAM:   Vitals:    /80 (BP Location: Right arm, Patient Position: Sitting, Cuff Size: Adult Regular)   Pulse 62   Temp 97.7  F (36.5  C)   Resp 20     General Appearance: Pleasant, alert, appropriate appearance for age. No acute distress  Chest/Respiratory Exam: Normal chest wall and respirations. Clear to auscultation.  Cardiovascular Exam: Regular rate and rhythm. S1, S2, no murmur, click, gallop, or rubs.  Musculoskeletal Exam: No spinal pain to palpation.  Very minimal pain on the superior bilateral thoracic paraspinous muscles that radiate in between the shoulder blades.  No bruising or swelling appreciated.  Full range of motion of upper extremities without discomfort.  No cervicalgia with range of motion.  Skin: no rash or abnormalities  Neurologic Exam: Nonfocal, normal gross motor, tone coordination and no tremor.  Psychiatric Exam: Alert and oriented - appropriate affect.    PHQ Depression Screen  PHQ-9 SCORE 8/29/2016   PHQ-9 Total Score 2       ASSESSMENT AND PLAN:      ICD-10-CM    1. Acute bilateral thoracic back pain M54.6          Discussed symptoms at length.  Patient declined an x-ray at this time.  Also discussed a cardiac work-up to rule out concerns.  Patient declined a cardiac work-up at this time.  Gave warning signs and symptoms.  Patient will return to clinic or emergency room if symptoms are changing or worsening.    Back pain:  Likely a muscle strain with the recent near fall.  Encouraged to take tylenol for relief up to 4 times per day as needed for pain.   Encouraged to use heat 15 minutes at a time several times per day to decrease pain. Return to clinic in the next few days as necessary for persistent pain. Return to clinic with any change or worsening of symptoms.    Patient Instructions   Back pain:  Encouraged  to take tylenol for relief up to 4 times per day as needed for pain.   Encouraged to use heat 15 minutes at a time several times per day to decrease pain. Return to clinic in the next few days as necessary for persistent pain. Return to clinic with any change or worsening of symptoms.         Kelly Martini PA-C PA-C..................2/26/2020 11:07 AM

## 2020-02-27 ENCOUNTER — NURSE TRIAGE (OUTPATIENT)
Dept: FAMILY MEDICINE | Facility: OTHER | Age: 85
End: 2020-02-27

## 2020-02-27 ENCOUNTER — HOSPITAL ENCOUNTER (EMERGENCY)
Facility: OTHER | Age: 85
End: 2020-02-27
Attending: FAMILY MEDICINE | Admitting: FAMILY MEDICINE
Payer: MEDICARE

## 2020-02-27 VITALS
OXYGEN SATURATION: 96 % | HEIGHT: 60 IN | DIASTOLIC BLOOD PRESSURE: 36 MMHG | HEART RATE: 48 BPM | WEIGHT: 121 LBS | SYSTOLIC BLOOD PRESSURE: 48 MMHG | TEMPERATURE: 97.3 F | BODY MASS INDEX: 23.75 KG/M2

## 2020-02-27 DIAGNOSIS — Z51.5 END OF LIFE CARE: ICD-10-CM

## 2020-02-27 DIAGNOSIS — E11.8 TYPE 2 DIABETES MELLITUS WITH COMPLICATION, WITHOUT LONG-TERM CURRENT USE OF INSULIN (H): ICD-10-CM

## 2020-02-27 DIAGNOSIS — H35.30 MACULAR DEGENERATION (SENILE) OF RETINA: ICD-10-CM

## 2020-02-27 DIAGNOSIS — E78.00 PURE HYPERCHOLESTEROLEMIA: ICD-10-CM

## 2020-02-27 DIAGNOSIS — H90.3 BILATERAL SENSORINEURAL HEARING LOSS: ICD-10-CM

## 2020-02-27 DIAGNOSIS — I21.4 NSTEMI (NON-ST ELEVATED MYOCARDIAL INFARCTION) (H): ICD-10-CM

## 2020-02-27 PROBLEM — I21.3 STEMI (ST ELEVATION MYOCARDIAL INFARCTION) (H): Status: ACTIVE | Noted: 2020-02-27

## 2020-02-27 LAB
ALBUMIN SERPL-MCNC: 4 G/DL (ref 3.5–5.7)
ALP SERPL-CCNC: 48 U/L (ref 34–104)
ALT SERPL W P-5'-P-CCNC: 16 U/L (ref 7–52)
ANION GAP SERPL CALCULATED.3IONS-SCNC: 11 MMOL/L (ref 6–17)
AST SERPL W P-5'-P-CCNC: 31 U/L (ref 13–39)
BASOPHILS # BLD AUTO: 0 10E9/L (ref 0–0.2)
BASOPHILS NFR BLD AUTO: 0.2 %
BILIRUB SERPL-MCNC: 0.7 MG/DL (ref 0.3–1)
BUN SERPL-MCNC: 20 MG/DL (ref 7–25)
CALCIUM SERPL-MCNC: 9.3 MG/DL (ref 8.6–10.3)
CHLORIDE SERPL-SCNC: 102 MMOL/L (ref 98–107)
CO2 SERPL-SCNC: 22 MMOL/L (ref 21–31)
CREAT SERPL-MCNC: 1.12 MG/DL (ref 0.6–1.2)
D DIMER PPP DDU-MCNC: 262 NG/ML D-DU (ref 0–230)
DIFFERENTIAL METHOD BLD: ABNORMAL
EOSINOPHIL # BLD AUTO: 0.1 10E9/L (ref 0–0.7)
EOSINOPHIL NFR BLD AUTO: 0.5 %
ERYTHROCYTE [DISTWIDTH] IN BLOOD BY AUTOMATED COUNT: 14 % (ref 10–15)
GFR SERPL CREATININE-BSD FRML MDRD: 45 ML/MIN/{1.73_M2}
GLUCOSE SERPL-MCNC: 224 MG/DL (ref 70–105)
HCT VFR BLD AUTO: 38 % (ref 35–47)
HGB BLD-MCNC: 12.3 G/DL (ref 11.7–15.7)
IMM GRANULOCYTES # BLD: 0.1 10E9/L (ref 0–0.4)
IMM GRANULOCYTES NFR BLD: 0.4 %
INTERPRETATION ECG - MUSE: NORMAL
LIPASE SERPL-CCNC: 44 U/L (ref 11–82)
LYMPHOCYTES # BLD AUTO: 2.2 10E9/L (ref 0.8–5.3)
LYMPHOCYTES NFR BLD AUTO: 18.8 %
MAGNESIUM SERPL-MCNC: 2 MG/DL (ref 1.9–2.7)
MCH RBC QN AUTO: 29.3 PG (ref 26.5–33)
MCHC RBC AUTO-ENTMCNC: 32.4 G/DL (ref 31.5–36.5)
MCV RBC AUTO: 91 FL (ref 78–100)
MONOCYTES # BLD AUTO: 1 10E9/L (ref 0–1.3)
MONOCYTES NFR BLD AUTO: 8.3 %
NEUTROPHILS # BLD AUTO: 8.5 10E9/L (ref 1.6–8.3)
NEUTROPHILS NFR BLD AUTO: 71.8 %
PLATELET # BLD AUTO: 204 10E9/L (ref 150–450)
POTASSIUM SERPL-SCNC: 4.7 MMOL/L (ref 3.5–5.1)
PROT SERPL-MCNC: 7.7 G/DL (ref 6.4–8.9)
RBC # BLD AUTO: 4.2 10E12/L (ref 3.8–5.2)
SODIUM SERPL-SCNC: 135 MMOL/L (ref 134–144)
TROPONIN I SERPL-MCNC: 370.7 PG/ML
WBC # BLD AUTO: 11.9 10E9/L (ref 4–11)

## 2020-02-27 PROCEDURE — 85379 FIBRIN DEGRADATION QUANT: CPT | Performed by: FAMILY MEDICINE

## 2020-02-27 PROCEDURE — 83690 ASSAY OF LIPASE: CPT | Performed by: FAMILY MEDICINE

## 2020-02-27 PROCEDURE — 99285 EMERGENCY DEPT VISIT HI MDM: CPT | Mod: 25 | Performed by: FAMILY MEDICINE

## 2020-02-27 PROCEDURE — 99285 EMERGENCY DEPT VISIT HI MDM: CPT | Mod: Z6 | Performed by: FAMILY MEDICINE

## 2020-02-27 PROCEDURE — 25800030 ZZH RX IP 258 OP 636: Performed by: FAMILY MEDICINE

## 2020-02-27 PROCEDURE — 83735 ASSAY OF MAGNESIUM: CPT | Performed by: FAMILY MEDICINE

## 2020-02-27 PROCEDURE — 93005 ELECTROCARDIOGRAM TRACING: CPT | Performed by: FAMILY MEDICINE

## 2020-02-27 PROCEDURE — 25000132 ZZH RX MED GY IP 250 OP 250 PS 637: Mod: GY | Performed by: FAMILY MEDICINE

## 2020-02-27 PROCEDURE — 85025 COMPLETE CBC W/AUTO DIFF WBC: CPT | Performed by: FAMILY MEDICINE

## 2020-02-27 PROCEDURE — 25000125 ZZHC RX 250: Performed by: FAMILY MEDICINE

## 2020-02-27 PROCEDURE — 96375 TX/PRO/DX INJ NEW DRUG ADDON: CPT | Performed by: FAMILY MEDICINE

## 2020-02-27 PROCEDURE — 93010 ELECTROCARDIOGRAM REPORT: CPT | Mod: 76 | Performed by: INTERNAL MEDICINE

## 2020-02-27 PROCEDURE — 99221 1ST HOSP IP/OBS SF/LOW 40: CPT | Mod: AI | Performed by: FAMILY MEDICINE

## 2020-02-27 PROCEDURE — 25000128 H RX IP 250 OP 636: Performed by: FAMILY MEDICINE

## 2020-02-27 PROCEDURE — 96376 TX/PRO/DX INJ SAME DRUG ADON: CPT | Performed by: FAMILY MEDICINE

## 2020-02-27 PROCEDURE — 84484 ASSAY OF TROPONIN QUANT: CPT | Performed by: FAMILY MEDICINE

## 2020-02-27 PROCEDURE — 93005 ELECTROCARDIOGRAM TRACING: CPT | Mod: 76

## 2020-02-27 PROCEDURE — 80053 COMPREHEN METABOLIC PANEL: CPT | Performed by: FAMILY MEDICINE

## 2020-02-27 PROCEDURE — 96374 THER/PROPH/DIAG INJ IV PUSH: CPT | Performed by: FAMILY MEDICINE

## 2020-02-27 RX ORDER — MORPHINE SULFATE 2 MG/ML
1-2 INJECTION, SOLUTION INTRAMUSCULAR; INTRAVENOUS
Status: DISCONTINUED | OUTPATIENT
Start: 2020-02-27 | End: 2020-02-27 | Stop reason: HOSPADM

## 2020-02-27 RX ORDER — SODIUM CHLORIDE 9 MG/ML
INJECTION, SOLUTION INTRAVENOUS CONTINUOUS
Status: DISCONTINUED | OUTPATIENT
Start: 2020-02-27 | End: 2020-02-27 | Stop reason: HOSPADM

## 2020-02-27 RX ORDER — HYDROMORPHONE HYDROCHLORIDE 1 MG/ML
.2-.5 INJECTION, SOLUTION INTRAMUSCULAR; INTRAVENOUS; SUBCUTANEOUS
Status: DISCONTINUED | OUTPATIENT
Start: 2020-02-27 | End: 2020-02-27 | Stop reason: HOSPADM

## 2020-02-27 RX ORDER — MINERAL OIL/HYDROPHIL PETROLAT
OINTMENT (GRAM) TOPICAL EVERY 8 HOURS PRN
Status: DISCONTINUED | OUTPATIENT
Start: 2020-02-27 | End: 2020-02-27 | Stop reason: HOSPADM

## 2020-02-27 RX ORDER — ONDANSETRON 2 MG/ML
4 INJECTION INTRAMUSCULAR; INTRAVENOUS EVERY 6 HOURS PRN
Status: DISCONTINUED | OUTPATIENT
Start: 2020-02-27 | End: 2020-02-27 | Stop reason: HOSPADM

## 2020-02-27 RX ORDER — ONDANSETRON 2 MG/ML
4 INJECTION INTRAMUSCULAR; INTRAVENOUS ONCE
Status: COMPLETED | OUTPATIENT
Start: 2020-02-27 | End: 2020-02-27

## 2020-02-27 RX ORDER — LORAZEPAM 2 MG/ML
.5-1 INJECTION INTRAMUSCULAR
Status: DISCONTINUED | OUTPATIENT
Start: 2020-02-27 | End: 2020-02-27 | Stop reason: HOSPADM

## 2020-02-27 RX ORDER — ALUMINA, MAGNESIA, AND SIMETHICONE 2400; 2400; 240 MG/30ML; MG/30ML; MG/30ML
15 SUSPENSION ORAL ONCE
Status: COMPLETED | OUTPATIENT
Start: 2020-02-27 | End: 2020-02-27

## 2020-02-27 RX ORDER — MORPHINE SULFATE 100 MG/5ML
5-10 SOLUTION ORAL
Status: DISCONTINUED | OUTPATIENT
Start: 2020-02-27 | End: 2020-02-27 | Stop reason: HOSPADM

## 2020-02-27 RX ORDER — ATROPINE SULFATE 10 MG/ML
1-2 SOLUTION/ DROPS OPHTHALMIC
Status: DISCONTINUED | OUTPATIENT
Start: 2020-02-27 | End: 2020-02-27 | Stop reason: HOSPADM

## 2020-02-27 RX ORDER — LORAZEPAM 0.5 MG/1
.5-1 TABLET ORAL
Status: DISCONTINUED | OUTPATIENT
Start: 2020-02-27 | End: 2020-02-27 | Stop reason: HOSPADM

## 2020-02-27 RX ORDER — ASPIRIN 81 MG/1
324 TABLET, CHEWABLE ORAL ONCE
Status: COMPLETED | OUTPATIENT
Start: 2020-02-27 | End: 2020-02-27

## 2020-02-27 RX ORDER — SODIUM CHLORIDE, SODIUM LACTATE, POTASSIUM CHLORIDE, CALCIUM CHLORIDE 600; 310; 30; 20 MG/100ML; MG/100ML; MG/100ML; MG/100ML
1000 INJECTION, SOLUTION INTRAVENOUS CONTINUOUS
Status: DISCONTINUED | OUTPATIENT
Start: 2020-02-27 | End: 2020-02-27 | Stop reason: HOSPADM

## 2020-02-27 RX ORDER — ACETAMINOPHEN 650 MG/1
650 SUPPOSITORY RECTAL EVERY 6 HOURS PRN
Status: DISCONTINUED | OUTPATIENT
Start: 2020-02-27 | End: 2020-02-27 | Stop reason: HOSPADM

## 2020-02-27 RX ORDER — ONDANSETRON 4 MG/1
4 TABLET, ORALLY DISINTEGRATING ORAL EVERY 6 HOURS PRN
Status: DISCONTINUED | OUTPATIENT
Start: 2020-02-27 | End: 2020-02-27 | Stop reason: HOSPADM

## 2020-02-27 RX ORDER — HYDROMORPHONE HYDROCHLORIDE 1 MG/ML
0.5 INJECTION, SOLUTION INTRAMUSCULAR; INTRAVENOUS; SUBCUTANEOUS ONCE
Status: COMPLETED | OUTPATIENT
Start: 2020-02-27 | End: 2020-02-27

## 2020-02-27 RX ORDER — LIDOCAINE HYDROCHLORIDE 20 MG/ML
15 SOLUTION OROPHARYNGEAL ONCE
Status: COMPLETED | OUTPATIENT
Start: 2020-02-27 | End: 2020-02-27

## 2020-02-27 RX ADMIN — HYDROMORPHONE HYDROCHLORIDE 0.5 MG: 1 INJECTION, SOLUTION INTRAMUSCULAR; INTRAVENOUS; SUBCUTANEOUS at 13:01

## 2020-02-27 RX ADMIN — LIDOCAINE HYDROCHLORIDE 15 ML: 20 SOLUTION ORAL; TOPICAL at 12:25

## 2020-02-27 RX ADMIN — SODIUM CHLORIDE, POTASSIUM CHLORIDE, SODIUM LACTATE AND CALCIUM CHLORIDE 1000 ML: 600; 310; 30; 20 INJECTION, SOLUTION INTRAVENOUS at 13:38

## 2020-02-27 RX ADMIN — ALUMINUM HYDROXIDE, MAGNESIUM HYDROXIDE, AND DIMETHICONE 15 ML: 400; 400; 40 SUSPENSION ORAL at 12:25

## 2020-02-27 RX ADMIN — ONDANSETRON 4 MG: 2 INJECTION INTRAMUSCULAR; INTRAVENOUS at 13:03

## 2020-02-27 RX ADMIN — SODIUM CHLORIDE, POTASSIUM CHLORIDE, SODIUM LACTATE AND CALCIUM CHLORIDE 500 ML: 600; 310; 30; 20 INJECTION, SOLUTION INTRAVENOUS at 12:32

## 2020-02-27 RX ADMIN — MORPHINE SULFATE 1 MG: 2 INJECTION, SOLUTION INTRAMUSCULAR; INTRAVENOUS at 12:42

## 2020-02-27 RX ADMIN — HYDROMORPHONE HYDROCHLORIDE 0.5 MG: 1 INJECTION, SOLUTION INTRAMUSCULAR; INTRAVENOUS; SUBCUTANEOUS at 13:33

## 2020-02-27 RX ADMIN — ASPIRIN 81 MG 324 MG: 81 TABLET ORAL at 12:24

## 2020-02-27 ASSESSMENT — ENCOUNTER SYMPTOMS
WEAKNESS: 0
SHORTNESS OF BREATH: 0
MUSCULOSKELETAL NEGATIVE: 1
NAUSEA: 1

## 2020-02-27 ASSESSMENT — MIFFLIN-ST. JEOR: SCORE: 850.35

## 2020-02-27 NOTE — ED TRIAGE NOTES
"Pt to ER with \"bad pains across my back\", also radiates to anterior chest.  Off and on for 3 days, today worse, pain now 8/10 sharp pain that is intermittent.  Mild nausea, no headache.  Radiated this AM to right shoulder.  When the pain comes on she states it makes her \"sleepy\".  "

## 2020-02-27 NOTE — TELEPHONE ENCOUNTER
Additional Information    Chest pain lasting longer than 5 minutes    Intermittent chest pain and pain has been increasing in severity or frequency    Protocols used: CHEST PAIN-A-OH

## 2020-02-27 NOTE — ED NOTES
Monitor showed 0 for heart rate at 1430. Pulse then increased back up to 40's after about 30 seconds. Will update MD and continue to monitor. Currently in the 30's for heart rate.

## 2020-02-27 NOTE — ED NOTES
Dr. Olson called time of death at 1443 at patient bedside. Family present at bedside at time of death. Per daughter requesting [facility]. Monitor and oxygen removed and patient's family informed they may take the time they need.

## 2020-02-27 NOTE — ED NOTES
1213: Stemi called, provider aware. Patient state she is having 10/10 pain in her back between shoulder blades. Described as sharp no squeezing and unchanging/constant, not lasting for hours. Started 3 days ago.     1243: Emesis and nausea, talking to provider for medication.     1305: Increased pain, gray/pale in color. Skin is a little more sweaty and clammy. Hands are cool to touch. Ear probe placed for oxygenation monitoring. RR 24. 10/10 pain and states some difficulty breathing. State feeling hot, declining a blanket. Temperature 97.4.     Patient provided pain medications as needed and able.

## 2020-02-27 NOTE — H&P
[facility]    History and Physical - Hospitalist Service       Date of Admission:  2/27/2020    Assessment & Plan   Julia Martin is a 89 year old female admitted on 2/27/2020. She has had intermittent chest pain over the past 3 days.  Was seen in clinic yesterday and declined cardiac work-up.  Found in the emergency department to have evidence of STEMI.  Refused transfer and wanted to be on comfort cares.    Family is present and understands that she is dying.  They would like her to be kept comfortable.    Principal Problem:    STEMI (ST elevation myocardial infarction) (H)    Assessment: Not able to find the EKG in the emergency department or access it through chart but looking at telemetry it looks like she continues to have STEMI and has markedly elevated troponin.  Currently however she appears comfortable and is unresponsive.  Patient having apneic breathing, bradycardic and hypotensive.    Plan: Comfort cares.  Active Problems:    Type 2 diabetes mellitus with complication, without long-term current use of insulin (H)    Assessment: Chronic.    Plan: Comfort cares.       Diet: NPO for Medical/Clinical Reasons Except for: Meds    DVT Prophylaxis: Comfort cares.  Pedroza Catheter: not present  Code Status: DNR/DNI      Disposition Plan   Expected discharge: Patient is expected to pass away imminently.  Entered: Gianfranco Sandoval MD 02/27/2020, 2:25 PM     The patient's care was discussed with the Patient and Patient's Family.    Gianfranco Sandoval MD  [facility]    ______________________________________________________________________    Chief Complaint   Chest/back pain    History is obtained from the chart and patient's family    History of Present Illness   Julia Martin is a 89 year old female who has been in good health up until recently.  They reports she has had some increasing difficulties with sugars with her diabetes and has had some logistical issues due  "to blindness but other than that has been doing well.  Over the past 3 days has been complaining of intermittent pain in between her shoulder but legs.  Also has been complaining of some neck pain and earlier today sharp substernal chest pain.  Family reports that she was in good spirits this morning, \"they were just talking to her this morning\".    They report that she was adamant that she did not want to be transferred and she did not want resuscitation or any aggressive interventions and wished to be kept comfortable in this situation.    Review of Systems    Review of systems not obtained due to patient factors - critical condition    Past Medical History    I have reviewed this patient's medical history and updated it with pertinent information if needed.   Past Medical History:   Diagnosis Date     Actinic keratosis     No Comments Provided     Deficiency of other specified B group vitamins (CODE)     4/15/2015     Hearing loss     No Comments Provided     Legal blindness      Type II diabetes mellitus with complication (H)     9/23/2015       Past Surgical History   I have reviewed this patient's surgical history and updated it with pertinent information if needed.  Past Surgical History:   Procedure Laterality Date     COLONOSCOPY      No Comments Provided     EXTRACAPSULAR CATARACT EXTRATION WITH INTRAOCULAR LENS IMPLANT      1997 and 2001     HYSTERECTOMY TOTAL ABDOMINAL, BILATERAL SALPINGO-OOPHORECTOMY, COMBINED      age 55, fibroids     OTHER SURGICAL HISTORY      16474.0,[place] INJ ANTER CHMBR EYE MEDICATN,Left,for macular degeneration     RELEASE CARPAL TUNNEL      No Comments Provided     TONSILLECTOMY      age 5       Social History   I have reviewed this patient's social history and updated it with pertinent information if needed.  Social History     Tobacco Use     Smoking status: Former Smoker     Smokeless tobacco: Never Used   Substance Use Topics     Alcohol use: No     Alcohol/week: 0.0 standard " drinks     Drug use: Unknown     Types: Other     Comment: Drug use: No       Family History   I have reviewed this patient's family history and updated it with pertinent information if needed.   Family History   Problem Relation Age of Onset     Breast Cancer Mother         Cancer-breast, breast cancer     Other - See Comments Mother         Stroke,stroke or sudden death     Parkinsonism Daughter          Parkinson's     Other - See Comments Brother          Parkinson's     Other - See Comments Brother          kidney failure     Cerebrovascular Disease Sister         Stroke,stroke     Ovarian Cancer Daughter         ovarian cancer       Prior to Admission Medications   Prior to Admission Medications   Prescriptions Last Dose Informant Patient Reported? Taking?   Cholecalciferol (VITAMIN D3) 1000 UNITS CAPS 2/27/2020 at Unknown time  Yes Yes   Sig: Take 1,000 Units by mouth daily   JANUVIA 50 MG PO tablet 2/27/2020 at Unknown time  No Yes   Sig: TAKE 1 TABLET ONCE DAILY EVERY MORNING   Multiple Vitamins-Minerals (PRESERVISION AREDS) CAPS 2/27/2020 at Unknown time  Yes Yes   Sig: Take 1 capsule by mouth daily    SIMVASTATIN 10 MG PO tablet 2/26/2020 at Unknown time  No Yes   Sig: TAKE 1 TABLET BY MOUTH NIGHTLY AT BEDTIME   aspirin  MG EC tablet Unknown at Unknown time  Yes No   Sig: Take 325 mg by mouth daily with food   blood glucose (PRODIGY NO CODING BLOOD GLUC) VI test strip Unknown at Unknown time  No No   Sig: Use to test blood sugar 2 times daily or as directed.   blood glucose monitoring (ACCU-CHEK FASTCLIX) lancets Unknown at Unknown time  No No   Sig: Use to test blood sugars one to two times daily as directed. Dx: E11.9   blood glucose monitoring (GLENIS CONTOUR) test strip Unknown at Unknown time  Yes No   Sig: Test blood sugars once per day Up to 2 times daily if necessary.  Dx: E11.9   blood glucose monitoring (GLENIS MICROLET) lancets Unknown at Unknown time  Yes No   Sig: Use to test blood sugars  one to two times daily as directed.  Dx: E11.9   blood glucose monitoring ([employer]gate GLUCOSE MONITOR) meter device kit Unknown at Unknown time  No No   Sig: Use to test blood sugar 2 times daily or as directed.   cyanocobalamin (RA VITAMIN B-12 TR) 1000 MCG TBCR 2/27/2020 at Unknown time  Yes Yes   Sig: Take 1,000 mcg by mouth daily   glipiZIDE (GLUCOTROL) 5 MG tablet 2/27/2020 at Unknown time  No Yes   Sig: TAKE 2 TABLETS BY MOUTH WITH BREAKFAST AND 1 TABLET AT SUPPER      Facility-Administered Medications: None     Allergies   No Known Allergies    Physical Exam   Vital Signs: Temp: 97.3  F (36.3  C) Temp src: Tympanic BP: (!) 74/52 Pulse: 116 Heart Rate: 115 Resp: 23 SpO2: 96 % O2 Device: Nasal cannula Oxygen Delivery: 2 LPM  Weight: 121 lbs 0 oz    Constitutional: Patient is comatose and unresponsive.  Respiratory: Patient is agonal breathing.  Lung fields are clear.  Cardiovascular: Heart rate is bradycardic.  Pulses are thready.  GI: Abdomen is nondistended.  Bowel sounds are not heard.  Neuropsychiatric: Patient appears comfortable.  She is not arousable.    Data   Data reviewed today: I reviewed all medications, new labs and imaging results over the last 24 hours. I personally reviewed no images or EKG's today.    Recent Labs   Lab 02/27/20  1219   WBC 11.9*   HGB 12.3   MCV 91         POTASSIUM 4.7   CHLORIDE 102   CO2 22   BUN 20   CR 1.12   ANIONGAP 11   KARRIE 9.3   *   ALBUMIN 4.0   PROTTOTAL 7.7   BILITOTAL 0.7   ALKPHOS 48   ALT 16   AST 31   LIPASE 44     No results found for this or any previous visit (from the past 24 hour(s)).

## 2020-02-27 NOTE — ED TRIAGE NOTES
Pt lives at [facility].  Pt was seen yesterday in clinic, dx with muscular pain. Here with daughter.

## 2020-02-27 NOTE — ED PROVIDER NOTES
History     Chief Complaint   Patient presents with     Chest Pain     Back Pain     HPI  Julia Martin is a 89 year old female who has been having 3 days of intermittent sharp substernal chest pains somewhat worse with laying down and better with sitting up with associated nausea but no shortness of breath or sweating.  She was in clinic to talk about this yesterday.  She has been having blood sugars in the 300 range intermittently.  She is blind but does not think she has been having any increased swelling of her legs from baseline.      Allergies:  No Known Allergies    Problem List:    Patient Active Problem List    Diagnosis Date Noted     STEMI (ST elevation myocardial infarction) (H) 02/27/2020     Priority: Medium     Bilateral sensorineural hearing loss 07/09/2019     Priority: Medium     Type 2 diabetes mellitus with complication, without long-term current use of insulin (H) 08/29/2016     Priority: Medium     B12 deficiency 04/15/2015     Priority: Medium     Cerebral infarction (H) 10/02/2014     Priority: Medium     Diabetic neuropathy, type II diabetes mellitus (H) 04/07/2014     Priority: Medium     Carpal tunnel syndrome of left wrist 07/09/2013     Priority: Medium     Macular degeneration (senile) of retina 07/25/2012     Priority: Medium     Actinic keratosis 06/27/2008     Priority: Medium     Pure hypercholesterolemia 11/02/2004     Priority: Medium        Past Medical History:    Past Medical History:   Diagnosis Date     Actinic keratosis      Deficiency of other specified B group vitamins (CODE)      Hearing loss      Legal blindness      Type II diabetes mellitus with complication (H)        Past Surgical History:    Past Surgical History:   Procedure Laterality Date     COLONOSCOPY      No Comments Provided     EXTRACAPSULAR CATARACT EXTRATION WITH INTRAOCULAR LENS IMPLANT      1997 and 2001     HYSTERECTOMY TOTAL ABDOMINAL, BILATERAL SALPINGO-OOPHORECTOMY, COMBINED      age 55,  fibroids     OTHER SURGICAL HISTORY      34036.0,MO INJ ANTER CHM[facility] EYE MEDICATN,Left,for macular degeneration     RELEASE CARPAL TUNNEL      No Comments Provided     TONSILLECTOMY      age 5       Family History:    Family History   Problem Relation Age of Onset     Breast Cancer Mother         Cancer-breast, breast cancer     Other - See Comments Mother         Stroke,stroke or sudden death     Parkinsonism Daughter          Parkinson's     Other - See Comments Brother          Parkinson's     Other - See Comments Brother          kidney failure     Cerebrovascular Disease Sister         Stroke,stroke     Ovarian Cancer Daughter         ovarian cancer       Social History:  Marital Status:   [5]  Social History     Tobacco Use     Smoking status: Former Smoker     Smokeless tobacco: Never Used   Substance Use Topics     Alcohol use: No     Alcohol/week: 0.0 standard drinks     Drug use: Unknown     Types: Other     Comment: Drug use: No        Medications:    Cholecalciferol (VITAMIN D3) 1000 UNITS CAPS  cyanocobalamin (RA VITAMIN B-12 TR) 1000 MCG TBCR  glipiZIDE (GLUCOTROL) 5 MG tablet  JANUVIA 50 MG PO tablet  Multiple Vitamins-Minerals (PRESERVISION AREDS) CAPS  SIMVASTATIN 10 MG PO tablet  aspirin  MG EC tablet  blood glucose (PRODIGY NO CODING BLOOD GLUC) VI test strip  blood glucose monitoring (ACCU-CHEK FASTCLIX) lancets  blood glucose monitoring (GLENIS CONTOUR) test strip  blood glucose monitoring (GLENIS MICROLET) lancets  blood glucose monitoring ([facility] VOICE GLUCOSE MONITOR) meter device kit          Review of Systems   Eyes:        Chronic limited vision.   Respiratory: Negative for shortness of breath.    Cardiovascular: Positive for chest pain (positional).   Gastrointestinal: Positive for nausea.   Genitourinary: Negative.    Musculoskeletal: Negative.    Neurological: Negative for weakness.       Physical Exam   BP: (!) 84/71  Pulse: 90  Heart Rate: 90  Temp: 97.3  F (36.3  C)  Resp:  20  Height: 152.4 cm (5')  Weight: 54.9 kg (121 lb)  SpO2: 96 %      Physical Exam  Vitals signs and nursing note reviewed.   Constitutional:       General: She is not in acute distress.     Appearance: She is well-developed and normal weight.   HENT:      Head: Normocephalic and atraumatic.   Neck:      Musculoskeletal: Normal range of motion and neck supple.   Cardiovascular:      Rate and Rhythm: Normal rate and regular rhythm.      Heart sounds: Murmur present. Systolic murmur present with a grade of 2/6.   Pulmonary:      Effort: Pulmonary effort is normal.      Breath sounds: Normal breath sounds.   Chest:      Chest wall: No tenderness.   Abdominal:      General: Bowel sounds are normal.      Palpations: Abdomen is soft.      Tenderness: There is no abdominal tenderness.   Musculoskeletal: Normal range of motion.      Right lower leg: She exhibits no tenderness. Edema (trace) present.      Left lower leg: She exhibits no tenderness. Edema (trace) present.   Skin:     General: Skin is warm.   Neurological:      General: No focal deficit present.      Mental Status: She is alert.   Psychiatric:         Mood and Affect: Mood normal.             ED Course        Procedures          EKG: Initial EKG on arrival at 1216 shows sinus rhythm at 80 bpm with ST depression across the precordium and question of possible elevation in 3 and aVF concern for acute ST elevation MI right-sided EKG is benign at 1220 and then after Lai evaluation patient is asymptomatic and her EKG at 1224 shows sinus rhythm at 84 bpm with occasional premature atrial complexes and a Q in 3 with some lateral strain pattern but no acute ischemia noted.    Critical Care time:  none             Results for orders placed or performed during the hospital encounter of 02/27/20 (from the past 24 hour(s))   CBC with platelets differential   Result Value Ref Range    WBC 11.9 (H) 4.0 - 11.0 10e9/L    RBC Count 4.20 3.8 - 5.2 10e12/L    Hemoglobin 12.3  11.7 - 15.7 g/dL    Hematocrit 38.0 35.0 - 47.0 %    MCV 91 78 - 100 fl    MCH 29.3 26.5 - 33.0 pg    MCHC 32.4 31.5 - 36.5 g/dL    RDW 14.0 10.0 - 15.0 %    Platelet Count 204 150 - 450 10e9/L    Diff Method Automated Method     % Neutrophils 71.8 %    % Lymphocytes 18.8 %    % Monocytes 8.3 %    % Eosinophils 0.5 %    % Basophils 0.2 %    % Immature Granulocytes 0.4 %    Absolute Neutrophil 8.5 (H) 1.6 - 8.3 10e9/L    Absolute Lymphocytes 2.2 0.8 - 5.3 10e9/L    Absolute Monocytes 1.0 0.0 - 1.3 10e9/L    Absolute Eosinophils 0.1 0.0 - 0.7 10e9/L    Absolute Basophils 0.0 0.0 - 0.2 10e9/L    Abs Immature Granulocytes 0.1 0 - 0.4 10e9/L   Troponin GH   Result Value Ref Range    Troponin 370.7 (H) <34.0 pg/mL   Comprehensive metabolic panel   Result Value Ref Range    Sodium 135 134 - 144 mmol/L    Potassium 4.7 3.5 - 5.1 mmol/L    Chloride 102 98 - 107 mmol/L    Carbon Dioxide 22 21 - 31 mmol/L    Anion Gap 11 6 - 17 mmol/L    Glucose 224 (H) 70 - 105 mg/dL    Urea Nitrogen 20 7 - 25 mg/dL    Creatinine 1.12 0.60 - 1.20 mg/dL    GFR Estimate 45 (L) >60 mL/min/[1.73_m2]    GFR Estimate If Black 54 (L) >60 mL/min/[1.73_m2]    Calcium 9.3 8.6 - 10.3 mg/dL    Bilirubin Total 0.7 0.3 - 1.0 mg/dL    Albumin 4.0 3.5 - 5.7 g/dL    Protein Total 7.7 6.4 - 8.9 g/dL    Alkaline Phosphatase 48 34 - 104 U/L    ALT 16 7 - 52 U/L    AST 31 13 - 39 U/L   Lipase   Result Value Ref Range    Lipase 44 11 - 82 U/L   Magnesium   Result Value Ref Range    Magnesium 2.0 1.9 - 2.7 mg/dL   D-Dimer GH   Result Value Ref Range    D-Dimer ng/mL 262 (H) 0 - 230 ng/ml D-DU       Medications   lactated ringers BOLUS 500 mL (500 mLs Intravenous New Bag 2/27/20 1232)     Followed by   lactated ringers infusion (1,000 mLs Intravenous New Bag 2/27/20 1338)   morphine (PF) injection 1-2 mg (1 mg Intravenous Given 2/27/20 1242)   ondansetron (ZOFRAN) injection 4 mg (has no administration in time range)   HYDROmorphone (PF) (DILAUDID) injection 0.2-0.5  mg (0.5 mg Intravenous Given 2/27/20 1333)   sodium chloride 0.9% infusion (has no administration in time range)   morphine sulfate HIGH CONCENTRATE (ROXANOL) 10 mg/0.5 mL (HIGH CONC) solution 5-10 mg (has no administration in time range)     Or   morphine sulfate HIGH CONCENTRATE (ROXANOL) 10 mg/0.5 mL (HIGH CONC) solution 5-10 mg (has no administration in time range)   morphine (PF) injection 1-2 mg (has no administration in time range)   acetaminophen (TYLENOL) Suppository 650 mg (has no administration in time range)   LORazepam (ATIVAN) injection 0.5-1 mg (has no administration in time range)     Or   LORazepam (ATIVAN) tablet 0.5-1 mg (has no administration in time range)     Or   LORazepam (ATIVAN) tablet 0.5-1 mg (has no administration in time range)   ondansetron (ZOFRAN-ODT) ODT tab 4 mg (has no administration in time range)     Or   ondansetron (ZOFRAN) injection 4 mg (has no administration in time range)   atropine 1 % ophthalmic solution 1-2 drop (has no administration in time range)   hypromellose-dextran (ARTIFICAL TEARS) 0.1-0.3 % ophthalmic solution 2 drop (has no administration in time range)   mineral oil-hydrophilic petrolatum (AQUAPHOR) (has no administration in time range)   aspirin (ASA) chewable tablet 324 mg (324 mg Oral Given 2/27/20 1224)   alum & mag hydroxide-simethicone (MAALOX  ES) suspension 15 mL (15 mLs Oral Given 2/27/20 1225)     And   lidocaine (XYLOCAINE) 2 % solution 15 mL (15 mLs Mouth/Throat Given 2/27/20 1225)   HYDROmorphone (PF) (DILAUDID) injection 0.5 mg (0.5 mg Intravenous Given 2/27/20 1301)   ondansetron (ZOFRAN) injection 4 mg (4 mg Intravenous Given 2/27/20 1303)     12:25 PM patient has been having a stuttering course of sharp chest pains and currently her CP has resolved and she has benign Right and repeat Left sided EKGs without evidence of STEMI now and patient not having any pain.  STEMI code called off.    12:38 PM stuttering course of pain in her upper back  and will give morphine and will get CT chest.  Lab concerned about possible hemolysis of blood from draw.    1:06 PM became acutely dyspneic and near syncopal with severe chest pains and her initial troponin has come back at 370.  I discussed that she is likely having a significant heart injury possibly over the past 3 days that is acutely worse now versus acute thoracic aorta emergency (dissection or aneurysm rupture) and offered air transport to [place] but patient at this point feels she is too sick and declines any further imaging tests or transfer, additions, and she also would like to stay here at [facility] for comfort measures only understanding that this is likely a fatal change in status.  Her daughter is present for the conversation and gets her brother on the phone and he is updated as to the patient's situation.  She is given 4 mg of Zofran IV and 0.5 mg of Dilaudid IV and feels slightly better.      1:22 PM I have discussed patient with Dr. Sandoval, Hospitalist accepting patient for comfort cares when bed available.  I have reviewed with patient and with her daughter and son-in-law in room and patient reaffirms that she doesn't want any transfer and just to be kept comfortable.      1:45 PM Patient is having another change in status and breathing has become slower and she is less responsive to daughter and son-in-law.    2:05 PM I rechecked and patient with hypoventilation and progressive bradycardia and patient no longer responsive.  Dtr and son-in-law are attentive and at her bedside.  Withdrawing alarms and monitoring.    Time of Death: 2:43 PM.  Patient passed away with family present.  No further cardiac electrical activity.  No respiratory effort.  No response to touch by family.  Ash Barclay MD     Assessments & Plan (with Medical Decision Making)   89-year-old female presents with 3 days of intermittent sharp severe chest pains with another episode prior to arrival however ended shortly after  getting her initial EKG with question of acute ischemia.  Subsequent right-sided and repeat left-sided EKGs do not show acute ischemia.  However after blood draw and starting work-up she has acute pain again with hypotension and pain radiating to her back between her shoulder blades.  Her initial troponin returns 370.7 and we discussed concerns for large non-STEMI versus acute aortic emergency.  Patient feels too ill to have testing, and we discussed options for air transport to [place] versus comfort measures here and patient  desires to stay at [facility] for comfort measures understanding likely fatal process ongoing.  She receives IV morphine and later IV Dilaudid along with Zofran antiemetic and has some improvement in her symptoms but continues to have declining blood pressure sats and level of consciousness.  She passes away peacefully with daughter son-in-law and grandson present.    I have reviewed the nursing notes.    I have reviewed the findings, diagnosis, plan and need for follow up with the patient.       New Prescriptions    No medications on file       Final diagnoses:   NSTEMI (non-ST elevated myocardial infarction) (H) - with large Troponin elevation.   Type 2 diabetes mellitus with complication, without long-term current use of insulin (H)   Macular degeneration (senile) of retina   Pure hypercholesterolemia   Bilateral sensorineural hearing loss   End of life care       2/27/2020   [facility]     Ash Barclay MD  02/27/20 [id number]

## 2020-03-31 NOTE — PHARMACY-ADMISSION MEDICATION HISTORY
Called pt no left VM with return message.   Pharmacy -- Admission Medication Reconciliation    Prior to admission (PTA) medications were reviewed and the patient's PTA medication list was updated.    Sources Consulted: [facility], [facility] staff  Did not meet with patient    The reliability of this Medication Reconciliation is: Reliability: Borderline reliable    The following significant changes were made:  Removed [facility] notes  Added directions to vitamins    In addition, the patient's allergies were reviewed with the patient's records and left as follows:   Allergies: Patient has no known allergies.       Medication barriers identified: not assessed, Per Stan Crespo does own medications (and all services)   Medication adherence concerns: not assessed   Understanding of emergency medications: not glucose listed    Noemi Maldonado [facility], 2/27/2020,  2:21 PM

## (undated) RX ORDER — SODIUM CHLORIDE 9 MG/ML
INJECTION, SOLUTION INTRAVENOUS
Status: DISPENSED
Start: 2018-10-09

## (undated) RX ORDER — MORPHINE SULFATE 2 MG/ML
INJECTION, SOLUTION INTRAMUSCULAR; INTRAVENOUS
Status: DISPENSED
Start: 2020-02-27

## (undated) RX ORDER — SODIUM CHLORIDE, SODIUM LACTATE, POTASSIUM CHLORIDE, CALCIUM CHLORIDE 600; 310; 30; 20 MG/100ML; MG/100ML; MG/100ML; MG/100ML
INJECTION, SOLUTION INTRAVENOUS
Status: DISPENSED
Start: 2020-02-27

## (undated) RX ORDER — CEFTRIAXONE SODIUM 1 G/50ML
INJECTION, SOLUTION INTRAVENOUS
Status: DISPENSED
Start: 2018-10-09

## (undated) RX ORDER — HYDROMORPHONE HYDROCHLORIDE 1 MG/ML
INJECTION, SOLUTION INTRAMUSCULAR; INTRAVENOUS; SUBCUTANEOUS
Status: DISPENSED
Start: 2020-02-27

## (undated) RX ORDER — ASPIRIN 81 MG/1
TABLET, CHEWABLE ORAL
Status: DISPENSED
Start: 2020-02-27

## (undated) RX ORDER — SODIUM CHLORIDE 9 MG/ML
INJECTION, SOLUTION INTRAVENOUS
Status: DISPENSED
Start: 2020-02-27

## (undated) RX ORDER — ONDANSETRON 2 MG/ML
INJECTION INTRAMUSCULAR; INTRAVENOUS
Status: DISPENSED
Start: 2018-10-09

## (undated) RX ORDER — ONDANSETRON 2 MG/ML
INJECTION INTRAMUSCULAR; INTRAVENOUS
Status: DISPENSED
Start: 2020-02-27

## (undated) RX ORDER — AZITHROMYCIN 500 MG/5ML
INJECTION, POWDER, LYOPHILIZED, FOR SOLUTION INTRAVENOUS
Status: DISPENSED
Start: 2018-10-09